# Patient Record
Sex: FEMALE | Race: BLACK OR AFRICAN AMERICAN | NOT HISPANIC OR LATINO | Employment: OTHER | URBAN - METROPOLITAN AREA
[De-identification: names, ages, dates, MRNs, and addresses within clinical notes are randomized per-mention and may not be internally consistent; named-entity substitution may affect disease eponyms.]

---

## 2017-12-22 ENCOUNTER — HOSPITAL ENCOUNTER (EMERGENCY)
Facility: HOSPITAL | Age: 67
Discharge: HOME | End: 2017-12-22
Payer: MEDICARE

## 2017-12-22 VITALS
SYSTOLIC BLOOD PRESSURE: 137 MMHG | TEMPERATURE: 98 F | OXYGEN SATURATION: 99 % | DIASTOLIC BLOOD PRESSURE: 72 MMHG | BODY MASS INDEX: 25.15 KG/M2 | HEART RATE: 61 BPM | WEIGHT: 142 LBS | RESPIRATION RATE: 18 BRPM

## 2017-12-22 DIAGNOSIS — S46.912A MUSCLE STRAIN OF LEFT UPPER ARM, INITIAL ENCOUNTER: Primary | ICD-10-CM

## 2017-12-22 DIAGNOSIS — R52 PAIN: ICD-10-CM

## 2017-12-22 DIAGNOSIS — M79.622 LEFT UPPER ARM PAIN: ICD-10-CM

## 2017-12-22 PROCEDURE — 99283 EMERGENCY DEPT VISIT LOW MDM: CPT

## 2017-12-22 RX ORDER — TIZANIDINE 4 MG/1
4 TABLET ORAL NIGHTLY PRN
Qty: 10 TABLET | Refills: 0 | Status: SHIPPED | OUTPATIENT
Start: 2017-12-22 | End: 2018-01-01

## 2017-12-22 NOTE — ED PROVIDER NOTES
"  History      Chief Complaint   Patient presents with    Arm Pain     Pt states, "I was picking up a chair Wednesday, and I felt a pinch in my arm" L upper arm pain. FROM noted.        Review of patient's allergies indicates:   Allergen Reactions    Ace inhibitors Swelling        HPI   HPI    2017, 1:44 PM   History obtained from the patient      History of Present Illness: Kevin Knight is a 67 y.o. female patient who presents to the Emergency Department for left upper arm pain x 2 days.  Patient states that pain started after she lifted a chair.  No treatments tried.  Movement increases pain.  Denies fever, congestion, chest pain, shortness of breath, weakness, numbness.        Arrival mode: Personal vehicle     PCP: Rizwan Lerma MD       Past Medical History:  Past Medical History:   Diagnosis Date    GERD (gastroesophageal reflux disease)     Hypertension        Past Surgical History:  Past Surgical History:   Procedure Laterality Date     SECTION           Family History:  History reviewed. No pertinent family history.    Social History:  Social History     Social History Main Topics    Smoking status: Never Smoker    Smokeless tobacco: Never Used    Alcohol use No    Drug use: No    Sexual activity: Not on file       ROS   Review of Systems   Constitutional: Negative for chills and fever.   HENT: Negative for congestion and sore throat.    Respiratory: Negative for cough and wheezing.    Gastrointestinal: Negative for diarrhea and vomiting.   Musculoskeletal: Positive for arthralgias and myalgias.       Physical Exam      Initial Vitals [17 1324]   BP Pulse Resp Temp SpO2   (!) 166/78 78 16 97.6 °F (36.4 °C) 100 %      MAP       107.33          Physical Exam  Nursing Notes and Vital Signs Reviewed.  Constitutional: Patient is in no apparent distress. Awake and alert. Well-developed and well-nourished.  Head: Atraumatic. Normocephalic.  Eyes: PERRL. EOM intact. " Conjunctivae are not pale. No scleral icterus.  ENT: Mucous membranes are moist. Oropharynx is clear and symmetric.    Neck: Supple. Full ROM. No lymphadenopathy.  Cardiovascular: Regular rate. Regular rhythm. No murmurs, rubs, or gallops. Distal pulses are 2+ and symmetric.  Pulmonary/Chest: No respiratory distress. Clear to auscultation bilaterally. No wheezing, rales, or rhonchi.  Abdominal: Soft and non-distended.  There is no tenderness.  No rebound, guarding, or rigidity. Good bowel sounds.  Musculoskeletal: Moves all extremities. No obvious deformities. No edema. No calf tenderness.    LUE:  Pain with flexion and extension of arm.  TTP left upper arm.  No bruising or swelling noted.  Brisk capillary refill; radial pulse 2+.  Skin: Warm and dry.  Neurological:  Alert, awake, and appropriate.  Normal speech.  No acute focal neurological deficits are appreciated.  Psychiatric: Normal affect. Good eye contact. Appropriate in content.    ED Course    Procedures  ED Vital Signs:  Vitals:    12/22/17 1324 12/22/17 1445   BP: (!) 166/78 137/72   Pulse: 78 61   Resp: 16 18   Temp: 97.6 °F (36.4 °C)    TempSrc: Oral    SpO2: 100% 99%   Weight: 64.4 kg (142 lb)        Abnormal Lab Results:  Labs Reviewed - No data to display       Imaging Results:  Imaging Results          X-Ray Humerus 2 View Left (Final result)  Result time 12/22/17 14:18:52    Final result by Saad Duarte MD (12/22/17 14:18:52)                 Impression:      No acute fracture or dislocation.        Electronically signed by: SAAD DUARTE MD  Date:     12/22/17  Time:    14:18              Narrative:    History:  Pain    Comparison:  None    Results: 2 views of the left humerus were obtained.     No evidence of acute fracture or dislocation.  Bony mineralization is normal.  Soft tissues are unremarkable. Visualized lung fields are clear.      Mild a.c. joint degenerative changes.                                      The Emergency Provider  reviewed the vital signs and test results, which are outlined above.    ED Discussion     3:01 PM:  Discussed with pt all pertinent ED information and results. Discussed pt dx and plan of tx. Gave pt all f/u and return to the ED instructions. All questions and concerns were addressed at this time. Pt expresses understanding of information and instructions, and is comfortable with plan to discharge. Pt is stable for discharge.    I discussed with patient and/or family/caretaker that evaluation in the ED does not suggest any emergent or life threatening medical conditions requiring immediate intervention beyond what was provided in the ED, and I believe patient is safe for discharge.  Regardless, an unremarkable evaluation in the ED does not preclude the development or presence of a serious of life threatening condition. As such, patient was instructed to return immediately for any worsening or change in current symptoms.      ED Medication(s):  Medications - No data to display    Discharge Medication List as of 12/22/2017  2:27 PM      START taking these medications    Details   tiZANidine (ZANAFLEX) 4 MG tablet Take 1 tablet (4 mg total) by mouth nightly as needed., Starting Fri 12/22/2017, Until Mon 1/1/2018, Print             Follow-up Information     Rizwan Leram MD In 3 days.    Specialty:  Family Medicine  Contact information:  [address] [facility]  [facility] [id number] [phone]                     Medical Decision Making                  Clinical Impression       ICD-10-CM ICD-9-CM   1. Muscle strain of left upper arm, initial encounter S46.912A 840.9   2. Left upper arm pain M79.622 729.5   3. Pain R52 780.96       Disposition:   Disposition: Discharged  Condition: Stable           Lexi Peterson PA-C  12/22/17 1501

## 2018-03-24 ENCOUNTER — HOSPITAL ENCOUNTER (EMERGENCY)
Facility: HOSPITAL | Age: 68
Discharge: HOME | End: 2018-03-25
Attending: EMERGENCY MEDICINE
Payer: MEDICARE

## 2018-03-24 VITALS
HEART RATE: 78 BPM | OXYGEN SATURATION: 100 % | DIASTOLIC BLOOD PRESSURE: 76 MMHG | HEIGHT: 63 IN | TEMPERATURE: 98 F | SYSTOLIC BLOOD PRESSURE: 165 MMHG | BODY MASS INDEX: 25.34 KG/M2 | RESPIRATION RATE: 20 BRPM | WEIGHT: 143 LBS

## 2018-03-24 DIAGNOSIS — S81.812A LACERATION OF LEFT LOWER EXTREMITY, INITIAL ENCOUNTER: Primary | ICD-10-CM

## 2018-03-24 PROCEDURE — 99283 EMERGENCY DEPT VISIT LOW MDM: CPT | Mod: 25

## 2018-03-24 PROCEDURE — 12034 INTMD RPR S/TR/EXT 7.6-12.5: CPT | Mod: LT

## 2018-03-24 PROCEDURE — 90471 IMMUNIZATION ADMIN: CPT | Performed by: EMERGENCY MEDICINE

## 2018-03-24 PROCEDURE — 63600175 PHARM REV CODE 636 W HCPCS: Performed by: EMERGENCY MEDICINE

## 2018-03-24 PROCEDURE — 90715 TDAP VACCINE 7 YRS/> IM: CPT | Performed by: EMERGENCY MEDICINE

## 2018-03-24 RX ORDER — LIDOCAINE HYDROCHLORIDE 10 MG/ML
1 INJECTION, SOLUTION EPIDURAL; INFILTRATION; INTRACAUDAL; PERINEURAL
Status: COMPLETED | OUTPATIENT
Start: 2018-03-24 | End: 2018-03-25

## 2018-03-24 RX ADMIN — CLOSTRIDIUM TETANI TOXOID ANTIGEN (FORMALDEHYDE INACTIVATED), CORYNEBACTERIUM DIPHTHERIAE TOXOID ANTIGEN (FORMALDEHYDE INACTIVATED), BORDETELLA PERTUSSIS TOXOID ANTIGEN (GLUTARALDEHYDE INACTIVATED), BORDETELLA PERTUSSIS FILAMENTOUS HEMAGGLUTININ ANTIGEN (FORMALDEHYDE INACTIVATED), BORDETELLA PERTUSSIS PERTACTIN ANTIGEN, AND BORDETELLA PERTUSSIS FIMBRIAE 2/3 ANTIGEN 0.5 ML: 5; 2; 2.5; 5; 3; 5 INJECTION, SUSPENSION INTRAMUSCULAR at 11:03

## 2018-03-25 PROCEDURE — 25000003 PHARM REV CODE 250: Performed by: EMERGENCY MEDICINE

## 2018-03-25 PROCEDURE — 12034 INTMD RPR S/TR/EXT 7.6-12.5: CPT | Mod: LT

## 2018-03-25 RX ADMIN — LIDOCAINE HYDROCHLORIDE 10 MG: 10 INJECTION, SOLUTION EPIDURAL; INFILTRATION; INTRACAUDAL; PERINEURAL at 12:03

## 2018-03-25 RX ADMIN — BACITRACIN, NEOMYCIN, POLYMYXIN B 1 EACH: 400; 3.5; 5 OINTMENT TOPICAL at 12:03
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
## 2018-04-02 ENCOUNTER — HOSPITAL ENCOUNTER (EMERGENCY)
Facility: HOSPITAL | Age: 68
Discharge: HOME | End: 2018-04-02
Payer: MEDICARE

## 2018-04-02 VITALS
DIASTOLIC BLOOD PRESSURE: 69 MMHG | RESPIRATION RATE: 16 BRPM | HEART RATE: 64 BPM | SYSTOLIC BLOOD PRESSURE: 162 MMHG | OXYGEN SATURATION: 100 % | HEIGHT: 62 IN | BODY MASS INDEX: 24.84 KG/M2 | WEIGHT: 135 LBS | TEMPERATURE: 98 F

## 2018-04-02 DIAGNOSIS — I10 ELEVATED BLOOD PRESSURE READING WITH DIAGNOSIS OF HYPERTENSION: ICD-10-CM

## 2018-04-02 DIAGNOSIS — Z48.02 VISIT FOR SUTURE REMOVAL: Primary | ICD-10-CM

## 2018-04-02 PROCEDURE — 99281 EMR DPT VST MAYX REQ PHY/QHP: CPT

## 2018-04-02 RX ORDER — ESOMEPRAZOLE MAGNESIUM 40 MG/1
40 CAPSULE, DELAYED RELEASE ORAL DAILY
COMMUNITY
Start: 2015-03-23 | End: 2019-12-16 | Stop reason: SDUPTHER
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
## 2018-04-11 ENCOUNTER — OFFICE VISIT (OUTPATIENT)
Dept: PODIATRY | Facility: CLINIC | Age: 68
End: 2018-04-11
Payer: MEDICARE

## 2018-04-11 VITALS
WEIGHT: 134.94 LBS | HEART RATE: 80 BPM | DIASTOLIC BLOOD PRESSURE: 79 MMHG | SYSTOLIC BLOOD PRESSURE: 139 MMHG | BODY MASS INDEX: 24.83 KG/M2 | HEIGHT: 62 IN

## 2018-04-11 DIAGNOSIS — M79.672 LEFT FOOT PAIN: ICD-10-CM

## 2018-04-11 DIAGNOSIS — Q82.8 POROKERATOSIS: Primary | ICD-10-CM

## 2018-04-11 PROCEDURE — 99203 OFFICE O/P NEW LOW 30 MIN: CPT | Mod: S$GLB,,, | Performed by: PODIATRIST

## 2018-04-11 PROCEDURE — 3078F DIAST BP <80 MM HG: CPT | Mod: CPTII,S$GLB,, | Performed by: PODIATRIST

## 2018-04-11 PROCEDURE — 3075F SYST BP GE 130 - 139MM HG: CPT | Mod: CPTII,S$GLB,, | Performed by: PODIATRIST

## 2018-04-11 PROCEDURE — 99999 PR PBB SHADOW E&M-EST. PATIENT-LVL III: CPT | Mod: PBBFAC,,, | Performed by: PODIATRIST

## 2018-04-11 NOTE — PROGRESS NOTES
[facility] -   PODIATRIC MEDICINE AND SURGERY  PROGRESS NOTE  4/17/2018    PODIATRY NOTE  PCP: Dr. Rizwan Lerma MD    CHIEF COMPLAINT   Chief Complaint   Patient presents with    Callouses     left plantar callous in arch pain when walking       HPI  Kevin Knight is a 67 y.o. female who has a past medical history of GERD (gastroesophageal reflux disease) and Hypertension.   Kevin presents to clinic today complaining of painful lesion on bottom of left foot.    Patient describes pain as:   Location:left plantar medial arch   Quality: throbbing  Severity:8/10  Duration:several months  Modifying Factors (Aggravating): weight bearing   Modifying Factors (Alleviating): offloading     Patient denies other pedal complaints at this time.     PMH  Past Medical History:   Diagnosis Date    GERD (gastroesophageal reflux disease)     Hypertension        PROBLEM LIST  There are no active problems to display for this patient.      MEDS  Current Outpatient Prescriptions on File Prior to Visit   Medication Sig Dispense Refill    amlodipine (NORVASC) 10 MG tablet Take 1 tablet (10 mg total) by mouth once daily. 30 tablet 0    esomeprazole (NEXIUM) 40 MG capsule Take 40 mg by mouth Daily.      hydrochlorothiazide (HYDRODIURIL) 25 MG tablet Take 1 tablet (25 mg total) by mouth once daily. 30 tablet 0    tramadol (ULTRAM) 50 mg tablet Take 50 mg by mouth every 6 (six) hours as needed for Pain.      vitamin D 1000 units Tab Take 185 mg by mouth once daily.       No current facility-administered medications on file prior to visit.        Medication List with Changes/Refills   Current Medications    AMLODIPINE (NORVASC) 10 MG TABLET    Take 1 tablet (10 mg total) by mouth once daily.    ESOMEPRAZOLE (NEXIUM) 40 MG CAPSULE    Take 40 mg by mouth Daily.    HYDROCHLOROTHIAZIDE (HYDRODIURIL) 25 MG TABLET    Take 1 tablet (25 mg total) by mouth once daily.    TRAMADOL (ULTRAM) 50 MG TABLET    Take 50 mg by  "mouth every 6 (six) hours as needed for Pain.    VITAMIN D 1000 UNITS TAB    Take 185 mg by mouth once daily.       PSH     Past Surgical History:   Procedure Laterality Date     SECTION          ALL  Review of patient's allergies indicates:   Allergen Reactions    Ace inhibitors Swelling       SOC     Social History   Substance Use Topics    Smoking status: Never Smoker    Smokeless tobacco: Never Used    Alcohol use No         FAMILY HX  No family history on file.         REVIEW OF SYSTEMS  Constitutional: Negative for chills and fever.   Respiratory: Negative for shortness of breath.    Cardiovascular: Negative for chest pain, palpitations, orthopnea, claudication and leg swelling.   Gastrointestinal: Negative for diarrhea, nausea and vomiting.   Musculoskeletal: Negative for joint pain. Positive for left foot pain   Skin: Negative for rash.   Neurological: Negative for dizziness, tingling, sensory change, focal weakness and weakness.   Psychiatric/Behavioral: Negative.    PHYSICAL EXAM  Vitals:    18 1328   BP: 139/79   Pulse: 80   Weight: 61.2 kg (134 lb 14.7 oz)   Height: 5' 2" (1.575 m)   PainSc:   8   PainLoc: Foot       General: This patient is well-developed, well-nourished and appears stated age, well-oriented to person, place and time, and cooperative and pleasant on today's visit    LOWER EXTREMITY  Vascular exam:   · Dorsalis pedis and posterior tibial pulses palpable 2/4 bilaterally.   · Capillary refill time immediate to the toes.   · Feet are warm to the touch. Skin temperature warm to warm from proximally to distally   · There are varicosities, telangiectasias noted to bilateral foot and ankle regions.   · There are no ecchymoses noted to bilateral foot and ankle regions.   · There is no gross lower extremity edema.    Dermatologic exam:   · Skin moist with healthy texture and turgor.  · There are no open ulcerations, lacerations, or fissures to bilateral foot and ankle regions. " There are no signs of infection as there is no erythema, no proximal-extending lymphangiitis, no fluctuance, or crepitus noted on palpation to bilateral foot and ankle regions.   · There is no interdigital maceration.   · There are hyperkeratotic lesion plantar medial arch LEFT.  Nails are well-trimmed.    Neurologic exam:  · Epicritic sensation is intact as the patient is able to sense light touch to bilateral foot and ankle regions.   · Achilles and patellar deep tendon reflexes intact  · Babinski reflex absent    Musculoskeletal/Orthopedic exam:   · No symptomatic structural abnormalities noted  · Muscle strength AT/EHL/EDL/PT: 5/5; Achilles/Gastroc/Soleus: 5/5; PB/PL: 5/5 Muscle tone is normal.  · Ankle joint ROM  B/L supple DF/PF, non-crepitus  · STJ ROM supple inv/ev, non crepitus   · MTPJ b/l supple DF/PF, non crepitus          ASSESSMENT  Encounter Diagnoses   Name Primary?    Porokeratosis - Left Foot Yes    Left foot pain          PLAN  1. Patient was educated about clinical and imaging findings, and verbalizes understanding of above.     Diagnoses and all orders for this visit:  Porokeratosis - Left Foot    Left foot pain      2. Treatment plan: -With patient's permission via verbal consent, the involved area was cleansed with an alcohol swab. Trimming of hyperkeratotic lesions deep to epidermal layer x 1 left foot was performed with a #15 blade without incident. Patient relates relief following the procedure. Patient will continue to monitor the areas daily, inspect feet, wear protective shoe gear when ambulatory, moisturizer to maintain skin integrity.    -Rx for Urea 40 OR Amlactin for calluses, metatarsal pad dispensed and applied for offloading callus. Shoe recommendations provided for accomodative foot wear.    3. RTC  for follow up/evaluation as scheduled       No future appointments.    Report Electronically Signed By:  Gisselle Mayer DPM   Podiatric Medicine & Surgery  [facility]  [place]  4/17/2018

## 2018-04-11 NOTE — PATIENT INSTRUCTIONS
"Porokeratosis    Many people suffer from painful growths on the bottom of their feet. The origins of these growths are varied. The most common growth on the bottom of the foot that may be painful is known as a callus, which is a broad layer of hard skin similar in appearance to the calluses one develops on their hands after doing labor like raking leaves.   However, some people develop small hard growths usually more than one on the bottom of their feet, many will complain they feel like they have a stone in their shoe. Although there are potentially many origins of this type of lesion including warts and intractable plantar keratoma, a very common cause of this type of growth is known as a porokeratosis.  Porokeratosis is a general term to a group of dermatological problems that occur in various parts of the body. This discussion is limited to punctate porokeratosis. These lesions are described as dozens of discrete or grouped seedlike hyperkeratotic lesions with characteristic thin, raised ridgelike margins that develop on the palms and the soles. Patients usually have other forms of porokeratosis as well, most commonly the linear or Mibelli types. Because of their description many patients will refer to them as seed corns.  These lesions generally occur on the weight bearing part of the foot which is the ball of the foot and the heel but may also occur in the mid arch. These lesions are generally not associated with any bony prominence as is the case with a callus or an intractable plantar keratoma.   These lesions are thought to be nothing more than plugged sweat glands however there is some debate as to whether this is true or not. In any event if you consider that the feet can have as many as 250,000 sweat glands it is easy to see how some of them could get "clogged" from contstant friction and pressure and cause this condition to occur.  The degree of discomfort a patient will experience will vary from person " to person. In many people they are a non-issue but in others they can be very painful. Factors that may exacerbate the pain include the number of lesions, their location, the type of shoe a patient wears, the amount of walking a person does during the course of a day and even possibly the amount or lack of fat on the bottom of the foot.    TREATMENT    For this reason there are various treatments ranging from absolutely nothing to surgical excision. In cases where there is pain during ambulation most podiatrists will attempt to curette (carve out) the lesion(s) from the surrounding skin.   In most cases this can be done without anesthesia and is not a particularly painful procedure. This is usually what I do the first time a patient presents with this type of problem. Based on recurrence, if any, will determine further treatment. Many times curetting the lesion(s) will be the first and last treatment for these lesions. In other cases after a period of relief ranging from a couple of days to a few years the patient may return complaining of pain in the same area. If it has been a fairly long time since last treatment I may just go ahead and curette the lesion again.   Wearing a cushioned innersole, although it will not rid you of the porokeratosis may reduce the pressure on these lesions and make it more comfortable to walk.    I recommend using Flexitol Heel Balm cream at night every night before bed.  This can be found at [facility], [facility], or [facility].

## 2018-07-24 DIAGNOSIS — M25.569 KNEE PAIN, UNSPECIFIED CHRONICITY, UNSPECIFIED LATERALITY: Primary | ICD-10-CM

## 2018-09-05 DIAGNOSIS — M25.562 PAIN IN BOTH KNEES, UNSPECIFIED CHRONICITY: Primary | ICD-10-CM

## 2018-09-05 DIAGNOSIS — M25.561 PAIN IN BOTH KNEES, UNSPECIFIED CHRONICITY: Primary | ICD-10-CM

## 2019-11-05 ENCOUNTER — HOSPITAL ENCOUNTER (EMERGENCY)
Facility: HOSPITAL | Age: 69
Discharge: HOME | End: 2019-11-05
Attending: EMERGENCY MEDICINE
Payer: MEDICARE

## 2019-11-05 VITALS
HEIGHT: 57 IN | RESPIRATION RATE: 20 BRPM | BODY MASS INDEX: 33.29 KG/M2 | OXYGEN SATURATION: 99 % | WEIGHT: 154.31 LBS | SYSTOLIC BLOOD PRESSURE: 140 MMHG | TEMPERATURE: 98 F | HEART RATE: 66 BPM | DIASTOLIC BLOOD PRESSURE: 67 MMHG

## 2019-11-05 DIAGNOSIS — M25.562 PATELLOFEMORAL ARTHRALGIA OF LEFT KNEE: Primary | ICD-10-CM

## 2019-11-05 DIAGNOSIS — M25.562 LEFT KNEE PAIN: ICD-10-CM

## 2019-11-05 DIAGNOSIS — M79.662 PAIN OF LEFT LOWER LEG: ICD-10-CM

## 2019-11-05 LAB — D DIMER PPP IA.FEU-MCNC: 0.39 MG/L FEU

## 2019-11-05 PROCEDURE — 85379 FIBRIN DEGRADATION QUANT: CPT | Mod: ER

## 2019-11-05 PROCEDURE — 99283 EMERGENCY DEPT VISIT LOW MDM: CPT | Mod: 25,ER

## 2019-11-05 RX ORDER — HYDROCHLOROTHIAZIDE 25 MG/1
25 TABLET ORAL DAILY
Refills: 0 | COMMUNITY
Start: 2019-09-12 | End: 2019-12-16 | Stop reason: SDUPTHER

## 2019-11-05 RX ORDER — NABUMETONE 500 MG/1
500 TABLET, FILM COATED ORAL DAILY
Qty: 30 TABLET | Refills: 0 | Status: SHIPPED | OUTPATIENT
Start: 2019-11-05 | End: 2021-04-17

## 2019-11-10 NOTE — ED PROVIDER NOTES
"Encounter Date: 2019       History     Chief Complaint   Patient presents with    Extremity Weakness     Pt c/o pain and weakness to left knee and leg. Pt has Hx of leg injury. Pt states left knee popping out of place.     The history is provided by the patient.   Leg Pain    The incident occurred at home. The injury mechanism was a fall. The incident occurred yesterday. The pain is present in the left knee and left leg (posterior aspect of the left lower leg). The quality of the pain is described as aching. The pain is at a severity of 7/10. The pain has been fluctuating since onset. Pertinent negatives include no numbness, no inability to bear weight, no loss of motion, no muscle weakness, no loss of sensation and no tingling. The symptoms are aggravated by activity, bearing weight and palpation. She has tried NSAIDs (Aleve) for the symptoms. The treatment provided no relief.   Patient presents with complaints of pain to her left knee and calf that began yesterday following a fall. She states that it "felt like her knee was popping out of place and just gave out on her".  She denies any further complaints or concerns.         PCP:     Rizwan Leram MD        Review of patient's allergies indicates:   Allergen Reactions    Ace inhibitors Swelling     Past Medical History:   Diagnosis Date    GERD (gastroesophageal reflux disease)     Hypertension      Past Surgical History:   Procedure Laterality Date     SECTION       History reviewed. No pertinent family history.  Social History     Tobacco Use    Smoking status: Never Smoker    Smokeless tobacco: Never Used   Substance Use Topics    Alcohol use: No    Drug use: No     Review of Systems   Constitutional: Negative for chills and fever.   HENT: Negative for congestion and sore throat.    Respiratory: Negative for cough, chest tightness, shortness of breath and wheezing.    Cardiovascular: Negative for chest pain and palpitations. "   Gastrointestinal: Negative for abdominal pain, diarrhea, nausea and vomiting.   Genitourinary: Negative for dysuria.   Musculoskeletal: Negative for back pain.        Positive for pain of the left knee and posterior aspect (calf) of left lower leg.    Skin: Negative for rash.   Neurological: Negative for dizziness, tingling, weakness, numbness and headaches.   Hematological: Does not bruise/bleed easily.       Physical Exam     Initial Vitals [11/05/19 1402]   BP Pulse Resp Temp SpO2   (!) 140/67 66 20 97.8 °F (36.6 °C) 99 %      MAP       --         Physical Exam    Nursing note and vitals reviewed.  Constitutional: She appears well-developed and well-nourished. She is cooperative. She does not appear ill. No distress.   HENT:   Head: Normocephalic and atraumatic.   Nose: Nose normal.   Mouth/Throat: Uvula is midline, oropharynx is clear and moist and mucous membranes are normal.   Eyes: Conjunctivae, EOM and lids are normal. Pupils are equal, round, and reactive to light.   Neck: Trachea normal and normal range of motion. Neck supple.   Cardiovascular: Normal rate, regular rhythm, intact distal pulses and normal pulses.   Pulmonary/Chest: Effort normal and breath sounds normal. No respiratory distress. She has no wheezes. She has no rhonchi. She has no rales.   Musculoskeletal: Normal range of motion. She exhibits no edema.        Left knee: She exhibits swelling (mild swelling noted to anterior aspect of the left knee - prepatellar region). She exhibits normal range of motion, no effusion and no ecchymosis. Tenderness (reproducible tenderness noted to left anterior knee - no crepitus or obvious deformity noted) found. No MCL and no LCL tenderness noted.        Left lower leg: She exhibits tenderness (reproducible tenderness to posterior aspect of the left lower leg upon palpation of the gastrocnemius - negative Cornell's - neurovascular intact distally ). She exhibits no bony tenderness, no swelling, no edema and  "no deformity.        Legs:  Neurological: She is alert and oriented to person, place, and time. She has normal strength. No sensory deficit. Gait normal. GCS eye subscore is 4. GCS verbal subscore is 5. GCS motor subscore is 6.   Neurovascular intact to all extremities.    Skin: Skin is warm, dry and intact. Capillary refill takes less than 2 seconds. No abrasion, no bruising, no ecchymosis and no rash noted.   Normal color and turgor.    Psychiatric: She has a normal mood and affect. Her speech is normal and behavior is normal. Cognition and memory are normal.         ED Course   Procedures    ED Lab Results:   Results for orders placed or performed during the hospital encounter of 11/05/19   D dimer, quantitative   Result Value Ref Range    D-Dimer 0.39 <0.50 mg/L FEU          ED Imaging Results:   Imaging Results          X-Ray Knee Complete 4 or More Views Left (Final result)  Result time 11/05/19 15:08:41    Final result by Barrera Avendaño MD (11/05/19 15:08:41)                 Impression:      Degenerative changes.      Electronically signed by: Barrera Avendaño MD  Date:    11/05/2019  Time:    15:08             Narrative:    EXAMINATION:  XR KNEE COMP 4 OR MORE VIEWS LEFT    CLINICAL HISTORY:  Pain in left knee.  .    COMPARISON:  None    FINDINGS:  Moderate medial and mild patellofemoral compartment DJD.  Medial compartment subchondral sclerosis and marginal osteophyte formation.  No acute fracture.  Alignment is satisfactory.                                ED Course Vitals  Vitals:    11/05/19 1402   BP: (!) 140/67   BP Location: Left arm   Patient Position: Sitting   Pulse: 66   Resp: 20   Temp: 97.8 °F (36.6 °C)   SpO2: 99%   Weight: 70 kg (154 lb 5.2 oz)   Height: 4' 9" (1.448 m)         1525 HOURS RE-EVALUATION & DISPOSITION:   Reassessment at the time of disposition demonstrates that the patient is resting comfortably in no acute distress.  She has remained hemodynamically stable throughout the entire ED " visit and is without objective evidence for acute process requiring urgent intervention or hospitalization. I discussed test results and provided counseling to patient with regard to condition, the treatment plan, specific conditions for return, and the importance of follow up.  Answered questions at this time. The patient is stable for discharge.         X-Rays:   Independently Interpreted Readings:   Other Readings:  Radiographs of the left knee reveal DJD without any acute findings.     Medical Decision Making:   History:   Old Records Summarized: records from clinic visits.  Clinical Tests:   Lab Tests: Ordered and Reviewed  Radiological Study: Ordered and Reviewed    Additional MDM:     Well's Criteria Score:  -Clinical symptoms of DVT (leg swelling, pain with palpation) = 3.0  -Other diagnosis less likely than pulmonary embolism =            0.0  -Heart Rate >100 =   0.0  -Immobilization (= or > than 3 days) or surgery in the previous 4 weeks = 0.0  -Previous DVT/PE = 0.0  -Hemoptysis =          0.0  -Malignancy =           0.0  Well's Probability Score =    3                                    Clinical Impression:       ICD-10-CM ICD-9-CM   1. Patellofemoral arthralgia of left knee M25.562 719.46   2. Left knee pain M25.562 719.46   3. Pain of left lower leg M79.662 729.5           Disposition:   Disposition: Discharged  Condition: Stable  I discussed with patient that the evaluation in the emergency department does not suggest any emergent or life threatening medical condition requiring immediate intervention beyond what was provided in the ED, and I believe patient is safe for discharge.  Regardless, an unremarkable evaluation in the ED does not preclude the development or presence of a serious of life threatening condition. As such, patient was instructed to return immediately for any worsening or change in current symptoms. I also discussed the results of my evaluation and diagnosis with patient and she  concurs with the evaluation and management plan.  Detailed written and verbal instructions provided to patient and she expressed a verbal understanding of the discharge instructions and overall management plan. Reiterated the importance of medication administration and safety and advised patient to follow up with primary care provider in 3-5 days or sooner if needed.  Also advised patient to return to the ER for any complications.     Regarding ARTHRALGIA, the patient was advised to participate in low-impact aerobic activity, perform range of motion exercises for improved flexibility, increase muscle tone by strength training, application of heat or ice, get plenty of sleep,  and avoid staying in one position for an extended period. Patient was encouraged to eat healthy diet full of fruits and vegetables, foods rich in omega-3 fatty acids, maintain/obtain healthy weight, and to take medications as prescribed.     Regarding KNEE PAIN: Patient instructed to follow prescribed therapy.  I encouraged patient to get plenty of rest, work to obtain/maintain healthy weight and BMI, exercise to improve muscle strength and motion to joint area, protect joint from further injury, and avoid overexerting extremity - especially when stiffness or pain is present. Advised patient to follow up with primary care provider in one week if symptoms are still present or condition worsens.         Discharge Medication List as of 11/5/2019  3:25 PM      START taking these medications    Details   nabumetone (RELAFEN) 500 MG tablet Take 1 tablet (500 mg total) by mouth once daily. Take after eating., Starting Tue 11/5/2019, Normal               Follow-up Information     Schedule an appointment as soon as possible for a visit  with Rizwan Lerma MD.    Specialty:  Family Medicine  Contact information:  [address] [facility] [place] [address]  [phone]             Call  Orthopedic Specialist.    Why:  If symptoms  worsen or as needed                              Thomas Doe NP  11/09/19 2025

## 2019-12-17 PROBLEM — G89.29 CHRONIC PAIN OF BOTH KNEES: Status: ACTIVE | Noted: 2019-12-17

## 2019-12-17 PROBLEM — J30.9 ALLERGIC RHINITIS: Status: ACTIVE | Noted: 2019-12-17

## 2019-12-17 PROBLEM — M79.10 MYALGIA: Status: ACTIVE | Noted: 2019-12-17

## 2019-12-17 PROBLEM — M25.561 CHRONIC PAIN OF BOTH KNEES: Status: ACTIVE | Noted: 2019-12-17

## 2019-12-17 PROBLEM — E55.9 VITAMIN D DEFICIENCY: Status: ACTIVE | Noted: 2019-12-17

## 2019-12-17 PROBLEM — K21.9 GERD (GASTROESOPHAGEAL REFLUX DISEASE): Status: ACTIVE | Noted: 2019-12-17

## 2019-12-17 PROBLEM — K59.00 CONSTIPATION: Status: ACTIVE | Noted: 2019-12-17

## 2019-12-17 PROBLEM — I10 HYPERTENSION: Status: ACTIVE | Noted: 2019-12-17

## 2019-12-17 PROBLEM — M25.562 CHRONIC PAIN OF BOTH KNEES: Status: ACTIVE | Noted: 2019-12-17

## 2020-01-29 RX ORDER — NABUMETONE 500 MG/1
500 TABLET, FILM COATED ORAL DAILY
Qty: 30 TABLET | Refills: 0 | OUTPATIENT
Start: 2020-01-29

## 2023-02-24 ENCOUNTER — TELEPHONE (OUTPATIENT)
Dept: INTERNAL MEDICINE | Facility: CLINIC | Age: 73
End: 2023-02-24
Payer: MEDICARE

## 2024-04-24 ENCOUNTER — OFFICE VISIT (OUTPATIENT)
Dept: INTERNAL MEDICINE | Facility: CLINIC | Age: 74
End: 2024-04-24
Payer: MEDICARE

## 2024-04-24 ENCOUNTER — TELEPHONE (OUTPATIENT)
Dept: INTERNAL MEDICINE | Facility: CLINIC | Age: 74
End: 2024-04-24
Payer: MEDICARE

## 2024-04-24 ENCOUNTER — LAB VISIT (OUTPATIENT)
Dept: LAB | Facility: HOSPITAL | Age: 74
End: 2024-04-24
Attending: NURSE PRACTITIONER
Payer: MEDICARE

## 2024-04-24 VITALS
SYSTOLIC BLOOD PRESSURE: 150 MMHG | OXYGEN SATURATION: 99 % | TEMPERATURE: 97 F | WEIGHT: 141.13 LBS | HEART RATE: 59 BPM | BODY MASS INDEX: 31.75 KG/M2 | RESPIRATION RATE: 18 BRPM | HEIGHT: 56 IN | DIASTOLIC BLOOD PRESSURE: 60 MMHG

## 2024-04-24 DIAGNOSIS — G31.83 MILD LEWY BODY DEMENTIA WITH PSYCHOTIC DISTURBANCE: ICD-10-CM

## 2024-04-24 DIAGNOSIS — F02.A2 MILD LEWY BODY DEMENTIA WITH PSYCHOTIC DISTURBANCE: ICD-10-CM

## 2024-04-24 DIAGNOSIS — R73.09 ELEVATED GLUCOSE: ICD-10-CM

## 2024-04-24 DIAGNOSIS — I10 ESSENTIAL HYPERTENSION: ICD-10-CM

## 2024-04-24 DIAGNOSIS — F32.1 MAJOR DEPRESSIVE DISORDER, SINGLE EPISODE, MODERATE: ICD-10-CM

## 2024-04-24 DIAGNOSIS — E78.5 HYPERLIPIDEMIA, UNSPECIFIED HYPERLIPIDEMIA TYPE: ICD-10-CM

## 2024-04-24 DIAGNOSIS — N18.31 CHRONIC KIDNEY DISEASE, STAGE 3A: ICD-10-CM

## 2024-04-24 DIAGNOSIS — I25.119 ATHEROSCLEROSIS OF NATIVE CORONARY ARTERY OF NATIVE HEART WITH ANGINA PECTORIS: ICD-10-CM

## 2024-04-24 DIAGNOSIS — R10.84 GENERALIZED ABDOMINAL PAIN: ICD-10-CM

## 2024-04-24 DIAGNOSIS — Z76.89 ENCOUNTER TO ESTABLISH CARE: ICD-10-CM

## 2024-04-24 DIAGNOSIS — I10 ESSENTIAL (PRIMARY) HYPERTENSION: ICD-10-CM

## 2024-04-24 DIAGNOSIS — Z13.31 POSITIVE DEPRESSION SCREENING: ICD-10-CM

## 2024-04-24 DIAGNOSIS — Z76.89 ENCOUNTER TO ESTABLISH CARE: Primary | ICD-10-CM

## 2024-04-24 PROBLEM — M79.10 MYALGIA: Status: RESOLVED | Noted: 2019-12-17 | Resolved: 2024-04-24

## 2024-04-24 PROBLEM — F02.82: Status: ACTIVE | Noted: 2022-12-20

## 2024-04-24 PROBLEM — I49.5 SICK SINUS SYNDROME: Status: ACTIVE | Noted: 2024-04-24

## 2024-04-24 PROBLEM — Z96.651 STATUS POST TOTAL RIGHT KNEE REPLACEMENT: Status: ACTIVE | Noted: 2021-04-01

## 2024-04-24 PROBLEM — F02.C2: Status: ACTIVE | Noted: 2024-04-24

## 2024-04-24 PROBLEM — L28.0 LICHEN SIMPLEX CHRONICUS: Chronic | Status: ACTIVE | Noted: 2021-03-25

## 2024-04-24 PROBLEM — G24.01 TARDIVE DYSKINESIA: Status: ACTIVE | Noted: 2022-11-27

## 2024-04-24 LAB
ALBUMIN SERPL BCP-MCNC: 3.6 G/DL (ref 3.5–5.2)
ALP SERPL-CCNC: 102 U/L (ref 55–135)
ALT SERPL W/O P-5'-P-CCNC: 17 U/L (ref 10–44)
ANION GAP SERPL CALC-SCNC: 10 MMOL/L (ref 8–16)
AST SERPL-CCNC: 24 U/L (ref 10–40)
BASOPHILS # BLD AUTO: 0.02 K/UL (ref 0–0.2)
BASOPHILS NFR BLD: 0.4 % (ref 0–1.9)
BILIRUB SERPL-MCNC: 0.2 MG/DL (ref 0.1–1)
BILIRUB UR QL STRIP: NEGATIVE
BUN SERPL-MCNC: 26 MG/DL (ref 8–23)
CALCIUM SERPL-MCNC: 9.5 MG/DL (ref 8.7–10.5)
CHLORIDE SERPL-SCNC: 108 MMOL/L (ref 95–110)
CLARITY UR REFRACT.AUTO: CLEAR
CO2 SERPL-SCNC: 23 MMOL/L (ref 23–29)
COLOR UR AUTO: YELLOW
CREAT SERPL-MCNC: 1.3 MG/DL (ref 0.5–1.4)
DIFFERENTIAL METHOD BLD: ABNORMAL
EOSINOPHIL # BLD AUTO: 0.1 K/UL (ref 0–0.5)
EOSINOPHIL NFR BLD: 1.2 % (ref 0–8)
ERYTHROCYTE [DISTWIDTH] IN BLOOD BY AUTOMATED COUNT: 16.2 % (ref 11.5–14.5)
EST. GFR  (NO RACE VARIABLE): 43.1 ML/MIN/1.73 M^2
GLUCOSE SERPL-MCNC: 83 MG/DL (ref 70–110)
GLUCOSE UR QL STRIP: NEGATIVE
HCT VFR BLD AUTO: 34.9 % (ref 37–48.5)
HGB BLD-MCNC: 11.6 G/DL (ref 12–16)
HGB UR QL STRIP: NEGATIVE
IMM GRANULOCYTES # BLD AUTO: 0.01 K/UL (ref 0–0.04)
IMM GRANULOCYTES NFR BLD AUTO: 0.2 % (ref 0–0.5)
KETONES UR QL STRIP: NEGATIVE
LEUKOCYTE ESTERASE UR QL STRIP: NEGATIVE
LYMPHOCYTES # BLD AUTO: 2.1 K/UL (ref 1–4.8)
LYMPHOCYTES NFR BLD: 42.5 % (ref 18–48)
MCH RBC QN AUTO: 31.9 PG (ref 27–31)
MCHC RBC AUTO-ENTMCNC: 33.2 G/DL (ref 32–36)
MCV RBC AUTO: 96 FL (ref 82–98)
MONOCYTES # BLD AUTO: 0.5 K/UL (ref 0.3–1)
MONOCYTES NFR BLD: 9.6 % (ref 4–15)
NEUTROPHILS # BLD AUTO: 2.3 K/UL (ref 1.8–7.7)
NEUTROPHILS NFR BLD: 46.1 % (ref 38–73)
NITRITE UR QL STRIP: NEGATIVE
NRBC BLD-RTO: 0 /100 WBC
PH UR STRIP: 6 [PH] (ref 5–8)
PLATELET # BLD AUTO: 203 K/UL (ref 150–450)
PMV BLD AUTO: 11 FL (ref 9.2–12.9)
POTASSIUM SERPL-SCNC: 4.3 MMOL/L (ref 3.5–5.1)
PROT SERPL-MCNC: 8.2 G/DL (ref 6–8.4)
PROT UR QL STRIP: NEGATIVE
RBC # BLD AUTO: 3.64 M/UL (ref 4–5.4)
SODIUM SERPL-SCNC: 141 MMOL/L (ref 136–145)
SP GR UR STRIP: 1.01 (ref 1–1.03)
TSH SERPL DL<=0.005 MIU/L-ACNC: 1.21 UIU/ML (ref 0.4–4)
URN SPEC COLLECT METH UR: NORMAL
UROBILINOGEN UR STRIP-ACNC: NEGATIVE EU/DL
WBC # BLD AUTO: 4.89 K/UL (ref 3.9–12.7)

## 2024-04-24 PROCEDURE — 1101F PT FALLS ASSESS-DOCD LE1/YR: CPT | Mod: CPTII,S$GLB,, | Performed by: NURSE PRACTITIONER

## 2024-04-24 PROCEDURE — 80061 LIPID PANEL: CPT | Performed by: NURSE PRACTITIONER

## 2024-04-24 PROCEDURE — 99204 OFFICE O/P NEW MOD 45 MIN: CPT | Mod: S$GLB,,, | Performed by: NURSE PRACTITIONER

## 2024-04-24 PROCEDURE — 85025 COMPLETE CBC W/AUTO DIFF WBC: CPT | Mod: PO | Performed by: NURSE PRACTITIONER

## 2024-04-24 PROCEDURE — 36415 COLL VENOUS BLD VENIPUNCTURE: CPT | Mod: PO | Performed by: NURSE PRACTITIONER

## 2024-04-24 PROCEDURE — 81003 URINALYSIS AUTO W/O SCOPE: CPT | Mod: PO | Performed by: NURSE PRACTITIONER

## 2024-04-24 PROCEDURE — 3078F DIAST BP <80 MM HG: CPT | Mod: CPTII,S$GLB,, | Performed by: NURSE PRACTITIONER

## 2024-04-24 PROCEDURE — 3288F FALL RISK ASSESSMENT DOCD: CPT | Mod: CPTII,S$GLB,, | Performed by: NURSE PRACTITIONER

## 2024-04-24 PROCEDURE — 3044F HG A1C LEVEL LT 7.0%: CPT | Mod: CPTII,S$GLB,, | Performed by: NURSE PRACTITIONER

## 2024-04-24 PROCEDURE — 83036 HEMOGLOBIN GLYCOSYLATED A1C: CPT | Performed by: NURSE PRACTITIONER

## 2024-04-24 PROCEDURE — 99999 PR PBB SHADOW E&M-EST. PATIENT-LVL IV: CPT | Mod: PBBFAC,,, | Performed by: NURSE PRACTITIONER

## 2024-04-24 PROCEDURE — 80053 COMPREHEN METABOLIC PANEL: CPT | Mod: PO | Performed by: NURSE PRACTITIONER

## 2024-04-24 PROCEDURE — 1126F AMNT PAIN NOTED NONE PRSNT: CPT | Mod: CPTII,S$GLB,, | Performed by: NURSE PRACTITIONER

## 2024-04-24 PROCEDURE — 3077F SYST BP >= 140 MM HG: CPT | Mod: CPTII,S$GLB,, | Performed by: NURSE PRACTITIONER

## 2024-04-24 PROCEDURE — 1160F RVW MEDS BY RX/DR IN RCRD: CPT | Mod: CPTII,S$GLB,, | Performed by: NURSE PRACTITIONER

## 2024-04-24 PROCEDURE — 1159F MED LIST DOCD IN RCRD: CPT | Mod: CPTII,S$GLB,, | Performed by: NURSE PRACTITIONER

## 2024-04-24 PROCEDURE — 84443 ASSAY THYROID STIM HORMONE: CPT | Mod: PO | Performed by: NURSE PRACTITIONER

## 2024-04-24 RX ORDER — RISPERIDONE 0.25 MG/1
TABLET ORAL
COMMUNITY

## 2024-04-24 RX ORDER — HYDROCHLOROTHIAZIDE 25 MG/1
25 TABLET ORAL DAILY
Qty: 30 TABLET | Refills: 2 | Status: SHIPPED | OUTPATIENT
Start: 2024-04-24 | End: 2024-07-23

## 2024-04-24 NOTE — PROGRESS NOTES
Subjective:       Patient ID: Kevin Knight is a 73 y.o. female.    Chief Complaint: Establish Care and Abdominal Pain    Mrs. Knight presents to clinic with her sister to establish care.  Medical history hypertension, CAD, CKD, and Lewy body dementia.  Release of information signed for prior PCP, Cardiology - Dr. Andujar, and Psychiatry, DR. Patel.  She was previously living with her son and daughter-in-law in [place]. Recently moved in with her sister in [place]  Recently hospitalized in inpatient psych due to hallucinations.  She has been having ongoing hallucination visual and auditory described as demons which is being followed by Psychiatry.  Lewy body dementia is being followed by Dr. Muhammad and Neurology.  She was previously on Aricept, which has now been discontinued.  Medications were adjusted during hospitalization.  Today, patient reports she is still having hallucinations, but are more manageable.  Blood pressure is elevated today.  Reports compliance with medication.    She is overall doing well.  Only complaints today include blurred vision and abdominal pain, which has now resolved.  She is due for routine labs, vaccines, mammogram, colonoscopy, and DEXA scan.        Patient Active Problem List   Diagnosis    Essential hypertension    Vitamin D deficiency    Gastroesophageal reflux disease    Allergic rhinitis    Constipation    Chronic pain of both knees    Atherosclerotic heart disease of native coronary artery with unspecified angina pectoris    Chronic kidney disease, stage 3a    Hyperlipidemia    Lewy body dementia with psychotic disturbance    Lichen simplex chronicus    Status post total right knee replacement    Sick sinus syndrome    Tardive dyskinesia    Major depressive disorder, single episode, moderate       Family History   Problem Relation Name Age of Onset    Hypertension Mother       Past Surgical History:  "  Procedure Laterality Date     SECTION           Current Outpatient Medications:     amLODIPine (NORVASC) 10 MG tablet, TAKE 1 TABLET (10 MG TOTAL) BY MOUTH ONCE DAILY., Disp: 90 tablet, Rfl: 0    risperiDONE (RISPERDAL) 0.25 MG Tab, Take by mouth., Disp: , Rfl:     hydroCHLOROthiazide (HYDRODIURIL) 25 MG tablet, Take 1 tablet (25 mg total) by mouth once daily., Disp: 30 tablet, Rfl: 2    simvastatin (ZOCOR) 20 MG tablet, Take 1 tablet (20 mg total) by mouth every evening., Disp: 90 tablet, Rfl: 3    Review of Systems   Constitutional:  Negative for chills and fever.   Eyes:  Positive for visual disturbance (blurry vision).   Respiratory:  Negative for shortness of breath.    Cardiovascular:  Negative for chest pain, palpitations and leg swelling.   Gastrointestinal:  Positive for abdominal pain (resovled) and constipation. Negative for diarrhea, nausea and vomiting.   Endocrine: Positive for polyuria. Negative for polydipsia and polyphagia.   Genitourinary:  Positive for urgency.   Musculoskeletal:  Negative for myalgias.   Neurological:  Negative for dizziness, weakness and light-headedness.   Psychiatric/Behavioral:  Positive for hallucinations (visual and audio) and sleep disturbance (sometimes). Negative for agitation, behavioral problems, confusion, dysphoric mood, self-injury and suicidal ideas.        Objective:   BP (!) 150/60 (BP Location: Left arm, Patient Position: Sitting, BP Method: Medium (Manual))   Pulse (!) 59   Temp 97.1 °F (36.2 °C)   Resp 18   Ht 4' 8" (1.422 m)   Wt 64 kg (141 lb 1.5 oz)   SpO2 99%   BMI 31.63 kg/m²      Physical Exam  Constitutional:       General: She is not in acute distress.     Appearance: Normal appearance. She is not ill-appearing.   HENT:      Head: Normocephalic.   Eyes:      Conjunctiva/sclera: Conjunctivae normal.      Comments: glasses   Cardiovascular:      Rate and Rhythm: Regular rhythm.      Heart sounds: Normal heart sounds. No murmur " heard.  Pulmonary:      Effort: Pulmonary effort is normal. No respiratory distress.      Breath sounds: Normal breath sounds. No wheezing, rhonchi or rales.   Abdominal:      General: Bowel sounds are normal. There is no distension.      Palpations: Abdomen is soft. There is no mass.      Tenderness: There is abdominal tenderness in the suprapubic area. There is no guarding or rebound.   Musculoskeletal:      Right lower le+ Pitting Edema present.      Left lower le+ Pitting Edema present.      Comments: +RW   Skin:     General: Skin is warm and dry.      Coloration: Skin is not pale.      Findings: No erythema or rash.   Neurological:      Mental Status: She is alert. Mental status is at baseline.      Gait: Gait abnormal.      Comments: Oriented to person, place, time, and situation   Psychiatric:         Mood and Affect: Mood normal.         Behavior: Behavior normal. Behavior is cooperative.         Thought Content: Thought content is not paranoid or delusional. Thought content does not include homicidal or suicidal ideation.         Assessment & Plan     1. Encounter to establish care  Comments:  Reviewed records available in EMR. [facility] for prior PCP, cardiology, and psych notes. Routine labs today. RTC in 2 weeks  Orders:  -     CBC Auto Differential; Future; Expected date: 2024  -     Comprehensive Metabolic Panel; Future; Expected date: 2024  -     TSH; Future; Expected date: 2024  -     Hemoglobin A1C; Future; Expected date: 2024  -     Lipid Panel; Future; Expected date: 2024    2. Mild Lewy body dementia with psychotic disturbance  Overview:  Dr. Muhammad and Dr. Palma    Assessment & Plan:  Reviewed last neurology note from Dr. Muhammad from 10/23.  Cognition appears at baseline.  No longer on Aricept.  Hallucinations being managed by Psychiatry.    Orders:  -     CBC Auto Differential; Future; Expected date: 2024  -     TSH; Future; Expected date:  04/24/2024    3. Essential hypertension  Assessment & Plan:  Blood pressure elevated today in clinic.  Continue amlodipine 10 mg daily.  We will add on hydrochlorothiazide 25 mg daily.  Keep blood pressure log x2 and follow up in clinic for further adjustments.    Orders:  -     hydroCHLOROthiazide (HYDRODIURIL) 25 MG tablet; Take 1 tablet (25 mg total) by mouth once daily.  Dispense: 30 tablet; Refill: 2  -     Comprehensive Metabolic Panel; Future; Expected date: 04/24/2024  -     Lipid Panel; Future; Expected date: 04/24/2024    4. Major depressive disorder, single episode, moderate  Overview:  Dr. Palma    Assessment & Plan:  Secondary to Lewy body dementia.  Denies SI/HI or self injury.  Continue Risperdal.  Followed by Psychiatry.      5. Atherosclerosis of native coronary artery of native heart with angina pectoris  Assessment & Plan:  Asymptomatic today.  Received records from cardiologist, Dr. Andujar, last seen 12/29/2023.  Requesting to establish care with Cardiology at [facility].  Reporting atorvastatin was stopped due to side effects. Will try starting simvastatin and monitor.    Orders:  -     Ambulatory referral/consult to Cardiology; Future; Expected date: 05/01/2024  -     Comprehensive Metabolic Panel; Future; Expected date: 04/24/2024  -     Lipid Panel; Future; Expected date: 04/24/2024    6. Chronic kidney disease, stage 3a  Assessment & Plan:  CMP today. Avoid NSAIDs for pain.  Stay well hydrated.      7. Hyperlipidemia, unspecified hyperlipidemia type  Assessment & Plan:  Lipid panel today.  Has not been taking atorvastatin due to potential side effects.  Will try starting simvastatin    Orders:  -     Ambulatory referral/consult to Cardiology; Future; Expected date: 05/01/2024  -     Hemoglobin A1C; Future; Expected date: 04/24/2024  -     Lipid Panel; Future; Expected date: 04/24/2024    8. Generalized abdominal pain  Comments:  Resolved. UA and labs today.  Orders:  -     CBC Auto  "Differential; Future; Expected date: 04/24/2024  -     Urinalysis, Reflex to Urine Culture Urine, Clean Catch    9. Elevated glucose  -     Hemoglobin A1C; Future; Expected date: 04/24/2024    10. Positive depression screening  Comments:  I have reviewed the positive depression score which warrants active treatment with psychotherapy and/or medications.          NEAL Schilling      Portions of this note may have been created with voice recognition software. Occasional "wrong-word" or "sound-a-like" substitutions may have occurred due to the inherent limitations of voice recognition software. Please, read the note carefully and recognize, using context, where substitutions have occurred.       "

## 2024-04-25 DIAGNOSIS — D51.9 ANEMIA DUE TO VITAMIN B12 DEFICIENCY, UNSPECIFIED B12 DEFICIENCY TYPE: ICD-10-CM

## 2024-04-25 DIAGNOSIS — D51.8 OTHER VITAMIN B12 DEFICIENCY ANEMIAS: ICD-10-CM

## 2024-04-25 DIAGNOSIS — E78.5 HYPERLIPIDEMIA, UNSPECIFIED HYPERLIPIDEMIA TYPE: ICD-10-CM

## 2024-04-25 DIAGNOSIS — D64.9 ANEMIA, UNSPECIFIED TYPE: Primary | ICD-10-CM

## 2024-04-25 LAB
CHOLEST SERPL-MCNC: 241 MG/DL (ref 120–199)
CHOLEST/HDLC SERPL: 2.6 {RATIO} (ref 2–5)
ESTIMATED AVG GLUCOSE: 94 MG/DL (ref 68–131)
HBA1C MFR BLD: 4.9 % (ref 4–5.6)
HDLC SERPL-MCNC: 91 MG/DL (ref 40–75)
HDLC SERPL: 37.8 % (ref 20–50)
LDLC SERPL CALC-MCNC: 126.8 MG/DL (ref 63–159)
NONHDLC SERPL-MCNC: 150 MG/DL
TRIGL SERPL-MCNC: 116 MG/DL (ref 30–150)

## 2024-04-25 RX ORDER — SIMVASTATIN 20 MG/1
20 TABLET, FILM COATED ORAL NIGHTLY
Qty: 90 TABLET | Refills: 3 | Status: SHIPPED | OUTPATIENT
Start: 2024-04-25 | End: 2025-04-25

## 2024-04-26 ENCOUNTER — TELEPHONE (OUTPATIENT)
Dept: CARDIOLOGY | Facility: CLINIC | Age: 74
End: 2024-04-26
Payer: MEDICARE

## 2024-04-26 PROBLEM — F32.1 MAJOR DEPRESSIVE DISORDER, SINGLE EPISODE, MODERATE: Status: ACTIVE | Noted: 2024-04-26

## 2024-04-26 NOTE — ASSESSMENT & PLAN NOTE
Secondary to Lewy body dementia.  Denies SI/HI or self injury.  Continue Risperdal.  Followed by Psychiatry.

## 2024-04-26 NOTE — ASSESSMENT & PLAN NOTE
Asymptomatic today.  Received records from cardiologist, Dr. Andujar, last seen 12/29/2023.  Requesting to establish care with Cardiology at [facility].  Reporting atorvastatin was stopped due to side effects. Will try starting simvastatin and monitor.

## 2024-04-26 NOTE — ASSESSMENT & PLAN NOTE
Blood pressure elevated today in clinic.  Continue amlodipine 10 mg daily.  We will add on hydrochlorothiazide 25 mg daily.  Keep blood pressure log x2 and follow up in clinic for further adjustments.

## 2024-04-26 NOTE — ASSESSMENT & PLAN NOTE
Lipid panel today.  Has not been taking atorvastatin due to potential side effects.  Will try starting simvastatin

## 2024-04-26 NOTE — TELEPHONE ENCOUNTER
Attempted to contact patient to schedule a New Patient appointment with one of our cardiologists. Left voice message to call our office back at [phone].

## 2024-04-26 NOTE — ASSESSMENT & PLAN NOTE
Reviewed last neurology note from Dr. Muhammad from 10/23.  Cognition appears at baseline.  No longer on Aricept.  Hallucinations being managed by Psychiatry.

## 2024-04-30 ENCOUNTER — TELEPHONE (OUTPATIENT)
Dept: CARDIOLOGY | Facility: CLINIC | Age: 74
End: 2024-04-30
Payer: MEDICARE

## 2024-04-30 NOTE — TELEPHONE ENCOUNTER
Patient contacted and confirmed the appointment for May. The patient was advised that the provider that goes [facility] location will not be available until August.

## 2024-05-08 ENCOUNTER — HOSPITAL ENCOUNTER (OUTPATIENT)
Dept: CARDIOLOGY | Facility: HOSPITAL | Age: 74
Discharge: HOME | End: 2024-05-08
Attending: NURSE PRACTITIONER
Payer: MEDICARE

## 2024-05-08 ENCOUNTER — OFFICE VISIT (OUTPATIENT)
Dept: INTERNAL MEDICINE | Facility: CLINIC | Age: 74
End: 2024-05-08
Payer: MEDICARE

## 2024-05-08 ENCOUNTER — HOSPITAL ENCOUNTER (OUTPATIENT)
Dept: RADIOLOGY | Facility: HOSPITAL | Age: 74
Discharge: HOME | End: 2024-05-08
Attending: NURSE PRACTITIONER
Payer: MEDICARE

## 2024-05-08 VITALS
WEIGHT: 136.88 LBS | OXYGEN SATURATION: 99 % | RESPIRATION RATE: 18 BRPM | TEMPERATURE: 98 F | HEART RATE: 66 BPM | BODY MASS INDEX: 30.79 KG/M2 | SYSTOLIC BLOOD PRESSURE: 134 MMHG | DIASTOLIC BLOOD PRESSURE: 62 MMHG | HEIGHT: 56 IN

## 2024-05-08 DIAGNOSIS — Z12.31 ENCOUNTER FOR SCREENING MAMMOGRAM FOR MALIGNANT NEOPLASM OF BREAST: ICD-10-CM

## 2024-05-08 DIAGNOSIS — I25.119 ATHEROSCLEROSIS OF NATIVE CORONARY ARTERY OF NATIVE HEART WITH ANGINA PECTORIS: ICD-10-CM

## 2024-05-08 DIAGNOSIS — E78.5 HYPERLIPIDEMIA, UNSPECIFIED HYPERLIPIDEMIA TYPE: ICD-10-CM

## 2024-05-08 DIAGNOSIS — F32.1 MAJOR DEPRESSIVE DISORDER, SINGLE EPISODE, MODERATE: ICD-10-CM

## 2024-05-08 DIAGNOSIS — Z78.0 POSTMENOPAUSAL: ICD-10-CM

## 2024-05-08 DIAGNOSIS — D64.9 ANEMIA, UNSPECIFIED TYPE: ICD-10-CM

## 2024-05-08 DIAGNOSIS — N18.31 CHRONIC KIDNEY DISEASE, STAGE 3A: ICD-10-CM

## 2024-05-08 DIAGNOSIS — K59.00 CONSTIPATION, UNSPECIFIED CONSTIPATION TYPE: ICD-10-CM

## 2024-05-08 DIAGNOSIS — F02.B2 MODERATE LEWY BODY DEMENTIA WITH PSYCHOTIC DISTURBANCE: ICD-10-CM

## 2024-05-08 DIAGNOSIS — I10 ESSENTIAL HYPERTENSION: Primary | ICD-10-CM

## 2024-05-08 DIAGNOSIS — R07.9 CHEST PAIN, UNSPECIFIED TYPE: ICD-10-CM

## 2024-05-08 DIAGNOSIS — Z12.11 COLON CANCER SCREENING: ICD-10-CM

## 2024-05-08 DIAGNOSIS — Z11.59 ENCOUNTER FOR HEPATITIS C SCREENING TEST FOR LOW RISK PATIENT: ICD-10-CM

## 2024-05-08 DIAGNOSIS — G31.83 MODERATE LEWY BODY DEMENTIA WITH PSYCHOTIC DISTURBANCE: ICD-10-CM

## 2024-05-08 PROBLEM — L28.0 LICHEN SIMPLEX CHRONICUS: Chronic | Status: RESOLVED | Noted: 2021-03-25 | Resolved: 2024-05-08

## 2024-05-08 PROBLEM — G24.01 TARDIVE DYSKINESIA: Status: RESOLVED | Noted: 2022-11-27 | Resolved: 2024-05-08

## 2024-05-08 LAB
OHS QRS DURATION: 170 MS
OHS QTC CALCULATION: 491 MS

## 2024-05-08 PROCEDURE — 99214 OFFICE O/P EST MOD 30 MIN: CPT | Mod: S$GLB,,, | Performed by: NURSE PRACTITIONER

## 2024-05-08 PROCEDURE — 77092 TBS I&R FX RSK QHP: CPT | Mod: ,,, | Performed by: RADIOLOGY

## 2024-05-08 PROCEDURE — 3075F SYST BP GE 130 - 139MM HG: CPT | Mod: CPTII,S$GLB,, | Performed by: NURSE PRACTITIONER

## 2024-05-08 PROCEDURE — 77091 TBS TECHL CALCULATION ONLY: CPT | Mod: PO

## 2024-05-08 PROCEDURE — 3078F DIAST BP <80 MM HG: CPT | Mod: CPTII,S$GLB,, | Performed by: NURSE PRACTITIONER

## 2024-05-08 PROCEDURE — G2211 COMPLEX E/M VISIT ADD ON: HCPCS | Mod: S$GLB,,, | Performed by: NURSE PRACTITIONER

## 2024-05-08 PROCEDURE — 1101F PT FALLS ASSESS-DOCD LE1/YR: CPT | Mod: CPTII,S$GLB,, | Performed by: NURSE PRACTITIONER

## 2024-05-08 PROCEDURE — 1126F AMNT PAIN NOTED NONE PRSNT: CPT | Mod: CPTII,S$GLB,, | Performed by: NURSE PRACTITIONER

## 2024-05-08 PROCEDURE — 93010 ELECTROCARDIOGRAM REPORT: CPT | Mod: S$GLB,,, | Performed by: INTERNAL MEDICINE

## 2024-05-08 PROCEDURE — 99999 PR PBB SHADOW E&M-EST. PATIENT-LVL IV: CPT | Mod: PBBFAC,,, | Performed by: NURSE PRACTITIONER

## 2024-05-08 PROCEDURE — 93005 ELECTROCARDIOGRAM TRACING: CPT | Mod: PO

## 2024-05-08 PROCEDURE — 3044F HG A1C LEVEL LT 7.0%: CPT | Mod: CPTII,S$GLB,, | Performed by: NURSE PRACTITIONER

## 2024-05-08 PROCEDURE — 3288F FALL RISK ASSESSMENT DOCD: CPT | Mod: CPTII,S$GLB,, | Performed by: NURSE PRACTITIONER

## 2024-05-08 PROCEDURE — 1159F MED LIST DOCD IN RCRD: CPT | Mod: CPTII,S$GLB,, | Performed by: NURSE PRACTITIONER

## 2024-05-08 PROCEDURE — 77080 DXA BONE DENSITY AXIAL: CPT | Mod: 26,,, | Performed by: RADIOLOGY

## 2024-05-08 PROCEDURE — 1160F RVW MEDS BY RX/DR IN RCRD: CPT | Mod: CPTII,S$GLB,, | Performed by: NURSE PRACTITIONER

## 2024-05-08 NOTE — ASSESSMENT & PLAN NOTE
Lab Results   Component Value Date    WBC 4.89 04/24/2024    HGB 11.6 (L) 04/24/2024    HCT 34.9 (L) 04/24/2024    MCV 96 04/24/2024     04/24/2024     Anemia panel today

## 2024-05-08 NOTE — ASSESSMENT & PLAN NOTE
Reporting chest pain today. EKG and labs today. Continue simvastatin. Keep appt with cardiology on 5/13

## 2024-05-08 NOTE — ASSESSMENT & PLAN NOTE
Start OTC stool softener or  Metamucil   Miralax PRN  Increase hydrate and fiber intake  Notify clinic if no improvement

## 2024-05-08 NOTE — ASSESSMENT & PLAN NOTE
Lab Results   Component Value Date    CHOL 241 (H) 04/24/2024    CHOL 201 (H) 05/01/2020    CHOL 214 (H) 02/24/2020     Lab Results   Component Value Date    HDL 91 (H) 04/24/2024    HDL 87 05/01/2020    HDL 81 02/24/2020     Lab Results   Component Value Date    LDLCALC 126.8 04/24/2024    LDLCALC 96 05/01/2020    LDLCALC 113 (H) 02/24/2020     Lab Results   Component Value Date    TRIG 116 04/24/2024    TRIG 89 05/01/2020    TRIG 102 02/24/2020       Lab Results   Component Value Date    CHOLHDL 37.8 04/24/2024     Uncontrolled. Side effects with atorvastatin. Will try simvastatin.

## 2024-05-08 NOTE — PROGRESS NOTES
Subjective:       Patient ID: Kevin Knight is a 73 y.o. female.    Chief Complaint: Follow-up and Hypertension    Mrs. Knight returns to clinic with her sister for HTN follow up and to review labs. At last visit, she presented to [facility] care.   Medical history hypertension, CAD, CKD, and Lewy body dementia with hallucinations. BP has improved since starting HCTZ with amlodipine. Home readings reportedly 130s. Cholesterol was elevated due to not taking atorvastatin. Reported side effects with atorvastation. She was also noted to be anemic. Not taking any supplements. She c/o left chest pain which  started today and ongoing constipation        Patient Active Problem List   Diagnosis    Essential hypertension    Vitamin D deficiency    Gastroesophageal reflux disease    Allergic rhinitis    Constipation    Chronic pain of both knees    Atherosclerotic heart disease of native coronary artery with unspecified angina pectoris    Chronic kidney disease, stage 3a    Hyperlipidemia    Lewy body dementia with psychotic disturbance    Status post total right knee replacement    Sick sinus syndrome    Major depressive disorder, single episode, moderate    Anemia       Family History   Problem Relation Name Age of Onset    Hypertension Mother       Past Surgical History:   Procedure Laterality Date     SECTION           Current Outpatient Medications:     amLODIPine (NORVASC) 10 MG tablet, TAKE 1 TABLET (10 MG TOTAL) BY MOUTH ONCE DAILY., Disp: 90 tablet, Rfl: 0    hydroCHLOROthiazide (HYDRODIURIL) 25 MG tablet, Take 1 tablet (25 mg total) by mouth once daily., Disp: 30 tablet, Rfl: 2    risperiDONE (RISPERDAL) 0.25 MG Tab, Take by mouth., Disp: , Rfl:     simvastatin (ZOCOR) 20 MG tablet, Take 1 tablet (20 mg total) by mouth every evening., Disp: 90 tablet, Rfl: 3    Review of Systems   Constitutional:  Negative for chills and fever.   Respiratory:  Negative for shortness of breath.    Cardiovascular:  Positive  "for chest pain. Negative for palpitations and leg swelling.   Gastrointestinal:  Positive for abdominal pain and constipation. Negative for diarrhea, nausea and vomiting.   Genitourinary:  Positive for urgency.   Musculoskeletal:  Negative for myalgias.   Neurological:  Negative for dizziness, weakness and light-headedness.   Psychiatric/Behavioral:  Positive for hallucinations (visual and audio) and sleep disturbance (sometimes). Negative for agitation, behavioral problems, confusion, dysphoric mood, self-injury and suicidal ideas.        Objective:   /62 (BP Location: Left arm, Patient Position: Sitting, BP Method: Medium (Manual))   Pulse 66   Temp 98.1 °F (36.7 °C)   Resp 18   Ht 4' 8" (1.422 m)   Wt 62.1 kg (136 lb 14.5 oz)   SpO2 99%   BMI 30.69 kg/m²      Physical Exam  Constitutional:       General: She is not in acute distress.     Appearance: Normal appearance. She is not ill-appearing.   HENT:      Head: Normocephalic.   Eyes:      Conjunctiva/sclera: Conjunctivae normal.      Comments: glasses   Cardiovascular:      Rate and Rhythm: Regular rhythm.      Heart sounds: Normal heart sounds. No murmur heard.  Pulmonary:      Effort: Pulmonary effort is normal. No respiratory distress.      Breath sounds: Normal breath sounds. No wheezing, rhonchi or rales.   Musculoskeletal:      Comments: +RW   Skin:     General: Skin is warm and dry.      Coloration: Skin is not pale.      Findings: No erythema or rash.   Neurological:      Mental Status: She is alert. Mental status is at baseline.      Gait: Gait abnormal.      Comments: Oriented to person, place, time, and situation   Psychiatric:         Mood and Affect: Mood normal.         Behavior: Behavior normal. Behavior is cooperative.         Thought Content: Thought content is not paranoid or delusional. Thought content does not include homicidal or suicidal ideation.         Assessment & Plan     1. Essential hypertension  Assessment & " Plan:  Improved. Continue amlodipine and HCTZ. Continue to monitor BP at home.      2. Major depressive disorder, single episode, moderate  Overview:  Dr. Palma    Assessment & Plan:  Secondary to Lewy body dementia.  Denies SI/HI or self injury.  Continue Risperdal.  Followed by Psychiatry.      3. Chest pain, unspecified type  -     IN OFFICE EKG 12-LEAD (to Muse)  -     TROPONIN I; Future; Expected date: 05/08/2024    4. Atherosclerosis of native coronary artery of native heart with angina pectoris  Assessment & Plan:  Reporting chest pain today. EKG and labs today. Continue simvastatin. Keep appt with cardiology on 5/13      5. Chronic kidney disease, stage 3a  Assessment & Plan:  GFR 43. Avoid NSAIDs. Stay well hydrated. Focus on BP control.       6. Hyperlipidemia, unspecified hyperlipidemia type  Assessment & Plan:  Lab Results   Component Value Date    CHOL 241 (H) 04/24/2024    CHOL 201 (H) 05/01/2020    CHOL 214 (H) 02/24/2020     Lab Results   Component Value Date    HDL 91 (H) 04/24/2024    HDL 87 05/01/2020    HDL 81 02/24/2020     Lab Results   Component Value Date    LDLCALC 126.8 04/24/2024    LDLCALC 96 05/01/2020    LDLCALC 113 (H) 02/24/2020     Lab Results   Component Value Date    TRIG 116 04/24/2024    TRIG 89 05/01/2020    TRIG 102 02/24/2020       Lab Results   Component Value Date    CHOLHDL 37.8 04/24/2024     Uncontrolled. Side effects with atorvastatin. Will try simvastatin.      7. Moderate Lewy body dementia with psychotic disturbance  Overview:  Dr. Muhammad and Dr. Palma    Assessment & Plan:  Stable. Continue Risperdal. Followed by neurology and psychiatry.       8. Constipation, unspecified constipation type  Assessment & Plan:  Start OTC stool softener or  Metamucil   Miralax PRN  Increase hydrate and fiber intake  Notify clinic if no improvement        9. Anemia, unspecified type  Assessment & Plan:  Lab Results   Component Value Date    WBC 4.89 04/24/2024    HGB 11.6 (L)  "04/24/2024    HCT 34.9 (L) 04/24/2024    MCV 96 04/24/2024     04/24/2024     Anemia panel today        10. Encounter for hepatitis C screening test for low risk patient  -     HEPATITIS C ANTIBODY; Future; Expected date: 05/08/2024    11. Postmenopausal  -     Cancel: DXA Bone Density Axial Skeleton 1 or more sites; Future; Expected date: 05/08/2024    12. Colon cancer screening  -     Cologuard Screening (Multitarget Stool DNA); Future; Expected date: 05/08/2024    13. Encounter for screening mammogram for malignant neoplasm of breast  -     Mammo Digital Screening Bilat w/ Herbie; Future; Expected date: 05/08/2024      Visit today included increased complexity associated with the care of the episodic problem  addressed and managing the longitudinal care of the patient due to the serious and/or complex managed problem(s) .        NEAL Schilling      Portions of this note may have been created with voice recognition software. Occasional "wrong-word" or "sound-a-like" substitutions may have occurred due to the inherent limitations of voice recognition software. Please, read the note carefully and recognize, using context, where substitutions have occurred.     "

## 2024-05-09 DIAGNOSIS — Z76.89 ENCOUNTER TO ESTABLISH CARE: Primary | ICD-10-CM

## 2024-05-09 DIAGNOSIS — Z00.00 ROUTINE ADULT HEALTH MAINTENANCE: ICD-10-CM

## 2024-05-13 ENCOUNTER — TELEPHONE (OUTPATIENT)
Dept: CARDIOLOGY | Facility: CLINIC | Age: 74
End: 2024-05-13
Payer: MEDICARE

## 2024-05-13 NOTE — TELEPHONE ENCOUNTER
Called and spoke to pt and her sister regarding rescheduling her appointment that was scheduled for today due to transportation. Pt rescheduled to Wednesday 05/15/24.    ----- Message from Suad Jacobsen sent at 5/13/2024  9:34 AM CDT -----  Contact: Kevin  Type:  Patient Returning Call    Who Called:Kevin  Who Left Message for Patient:Naeem Zelaya MA  Does the patient know what this is regarding?:Appointment  Would the patient rather a call back or a response via [facility]? Callback  Best Call Back Number:[phone]  Additional Information: Missed call

## 2024-05-13 NOTE — TELEPHONE ENCOUNTER
Called and LVM for pt to return call to the office regarding rescheduling her appointment.       ----- Message from Wilson Hamilton sent at 5/13/2024  9:22 AM CDT -----  Contact: [phone]  Patient needs first available appointment due to rescheduling dr appt and ekg. Pt transportation is not available. Please call patient at [phone] . Thanks KB

## 2024-05-15 ENCOUNTER — HOSPITAL ENCOUNTER (OUTPATIENT)
Dept: CARDIOLOGY | Facility: HOSPITAL | Age: 74
Discharge: HOME | End: 2024-05-15
Attending: INTERNAL MEDICINE
Payer: MEDICARE

## 2024-05-15 DIAGNOSIS — Z00.00 ROUTINE ADULT HEALTH MAINTENANCE: ICD-10-CM

## 2024-05-15 DIAGNOSIS — Z76.89 ENCOUNTER TO ESTABLISH CARE: ICD-10-CM

## 2024-05-23 ENCOUNTER — TELEPHONE (OUTPATIENT)
Dept: CARDIOLOGY | Facility: HOSPITAL | Age: 74
End: 2024-05-23
Payer: MEDICARE

## 2024-08-22 ENCOUNTER — HOSPITAL ENCOUNTER (EMERGENCY)
Facility: HOSPITAL | Age: 74
Discharge: HOME | End: 2024-08-22
Attending: EMERGENCY MEDICINE
Payer: MEDICARE

## 2024-08-22 VITALS
SYSTOLIC BLOOD PRESSURE: 111 MMHG | DIASTOLIC BLOOD PRESSURE: 58 MMHG | OXYGEN SATURATION: 98 % | WEIGHT: 130 LBS | BODY MASS INDEX: 29.25 KG/M2 | RESPIRATION RATE: 18 BRPM | HEIGHT: 56 IN | HEART RATE: 61 BPM | TEMPERATURE: 98 F

## 2024-08-22 DIAGNOSIS — M79.606 LEG PAIN: ICD-10-CM

## 2024-08-22 DIAGNOSIS — M79.89 LEG SWELLING: ICD-10-CM

## 2024-08-22 DIAGNOSIS — S80.11XA CONTUSION OF MULTIPLE SITES OF RIGHT LOWER EXTREMITY, INITIAL ENCOUNTER: ICD-10-CM

## 2024-08-22 DIAGNOSIS — S60.521A BLISTER (NONTHERMAL) OF RIGHT HAND, INITIAL ENCOUNTER: Primary | ICD-10-CM

## 2024-08-22 LAB
ALBUMIN SERPL BCP-MCNC: 3.5 G/DL (ref 3.5–5.2)
ALP SERPL-CCNC: 90 U/L (ref 55–135)
ALT SERPL W/O P-5'-P-CCNC: 12 U/L (ref 10–44)
ANION GAP SERPL CALC-SCNC: 11 MMOL/L (ref 8–16)
AST SERPL-CCNC: 20 U/L (ref 10–40)
BASOPHILS # BLD AUTO: 0.02 K/UL (ref 0–0.2)
BASOPHILS NFR BLD: 0.4 % (ref 0–1.9)
BILIRUB SERPL-MCNC: 0.4 MG/DL (ref 0.1–1)
BILIRUB UR QL STRIP: NEGATIVE
BNP SERPL-MCNC: 185 PG/ML (ref 0–99)
BUN SERPL-MCNC: 22 MG/DL (ref 8–23)
CALCIUM SERPL-MCNC: 9.7 MG/DL (ref 8.7–10.5)
CHLORIDE SERPL-SCNC: 108 MMOL/L (ref 95–110)
CK SERPL-CCNC: 53 U/L (ref 20–180)
CLARITY UR REFRACT.AUTO: CLEAR
CO2 SERPL-SCNC: 23 MMOL/L (ref 23–29)
COLOR UR AUTO: YELLOW
CREAT SERPL-MCNC: 1.4 MG/DL (ref 0.5–1.4)
DIFFERENTIAL METHOD BLD: ABNORMAL
EOSINOPHIL # BLD AUTO: 0 K/UL (ref 0–0.5)
EOSINOPHIL NFR BLD: 0.7 % (ref 0–8)
ERYTHROCYTE [DISTWIDTH] IN BLOOD BY AUTOMATED COUNT: 16.2 % (ref 11.5–14.5)
EST. GFR  (NO RACE VARIABLE): 39.2 ML/MIN/1.73 M^2
GLUCOSE SERPL-MCNC: 95 MG/DL (ref 70–110)
GLUCOSE UR QL STRIP: NEGATIVE
HCT VFR BLD AUTO: 35.5 % (ref 37–48.5)
HGB BLD-MCNC: 11.8 G/DL (ref 12–16)
HGB UR QL STRIP: NEGATIVE
IMM GRANULOCYTES # BLD AUTO: 0.01 K/UL (ref 0–0.04)
IMM GRANULOCYTES NFR BLD AUTO: 0.2 % (ref 0–0.5)
KETONES UR QL STRIP: NEGATIVE
LEUKOCYTE ESTERASE UR QL STRIP: NEGATIVE
LYMPHOCYTES # BLD AUTO: 1.2 K/UL (ref 1–4.8)
LYMPHOCYTES NFR BLD: 26.8 % (ref 18–48)
MCH RBC QN AUTO: 31.4 PG (ref 27–31)
MCHC RBC AUTO-ENTMCNC: 33.2 G/DL (ref 32–36)
MCV RBC AUTO: 94 FL (ref 82–98)
MONOCYTES # BLD AUTO: 0.4 K/UL (ref 0.3–1)
MONOCYTES NFR BLD: 9.7 % (ref 4–15)
NEUTROPHILS # BLD AUTO: 2.8 K/UL (ref 1.8–7.7)
NEUTROPHILS NFR BLD: 62.2 % (ref 38–73)
NITRITE UR QL STRIP: NEGATIVE
NRBC BLD-RTO: 0 /100 WBC
PH UR STRIP: 6 [PH] (ref 5–8)
PLATELET # BLD AUTO: 208 K/UL (ref 150–450)
PMV BLD AUTO: 11.2 FL (ref 9.2–12.9)
POTASSIUM SERPL-SCNC: 4.3 MMOL/L (ref 3.5–5.1)
PROT SERPL-MCNC: 7.7 G/DL (ref 6–8.4)
PROT UR QL STRIP: NEGATIVE
RBC # BLD AUTO: 3.76 M/UL (ref 4–5.4)
SODIUM SERPL-SCNC: 142 MMOL/L (ref 136–145)
SP GR UR STRIP: >=1.03 (ref 1–1.03)
TROPONIN I SERPL DL<=0.01 NG/ML-MCNC: <0.006 NG/ML (ref 0–0.03)
URN SPEC COLLECT METH UR: ABNORMAL
UROBILINOGEN UR STRIP-ACNC: NEGATIVE EU/DL
WBC # BLD AUTO: 4.52 K/UL (ref 3.9–12.7)

## 2024-08-22 PROCEDURE — 80053 COMPREHEN METABOLIC PANEL: CPT | Mod: ER | Performed by: EMERGENCY MEDICINE

## 2024-08-22 PROCEDURE — 85025 COMPLETE CBC W/AUTO DIFF WBC: CPT | Mod: ER | Performed by: EMERGENCY MEDICINE

## 2024-08-22 PROCEDURE — 81003 URINALYSIS AUTO W/O SCOPE: CPT | Mod: ER | Performed by: EMERGENCY MEDICINE

## 2024-08-22 PROCEDURE — 99285 EMERGENCY DEPT VISIT HI MDM: CPT | Mod: 25,ER

## 2024-08-22 PROCEDURE — 25000003 PHARM REV CODE 250: Mod: ER | Performed by: EMERGENCY MEDICINE

## 2024-08-22 PROCEDURE — 93010 ELECTROCARDIOGRAM REPORT: CPT | Mod: ,,, | Performed by: INTERNAL MEDICINE

## 2024-08-22 PROCEDURE — 83880 ASSAY OF NATRIURETIC PEPTIDE: CPT | Mod: ER | Performed by: EMERGENCY MEDICINE

## 2024-08-22 PROCEDURE — 82550 ASSAY OF CK (CPK): CPT | Mod: ER | Performed by: EMERGENCY MEDICINE

## 2024-08-22 PROCEDURE — 93005 ELECTROCARDIOGRAM TRACING: CPT | Mod: ER

## 2024-08-22 PROCEDURE — 84484 ASSAY OF TROPONIN QUANT: CPT | Mod: ER | Performed by: EMERGENCY MEDICINE

## 2024-08-22 PROCEDURE — 86803 HEPATITIS C AB TEST: CPT | Performed by: EMERGENCY MEDICINE

## 2024-08-22 PROCEDURE — 87389 HIV-1 AG W/HIV-1&-2 AB AG IA: CPT | Performed by: EMERGENCY MEDICINE

## 2024-08-22 RX ORDER — ACETAMINOPHEN 500 MG
1000 TABLET ORAL
Status: COMPLETED | OUTPATIENT
Start: 2024-08-22 | End: 2024-08-22

## 2024-08-22 RX ORDER — ACETAMINOPHEN 325 MG/1
325 TABLET ORAL EVERY 6 HOURS PRN
COMMUNITY
Start: 2024-08-22

## 2024-08-22 RX ADMIN — ACETAMINOPHEN 1000 MG: 500 TABLET ORAL at 07:08

## 2024-08-22 NOTE — ED PROVIDER NOTES
Encounter Date: 2024       History     Chief Complaint   Patient presents with    Swelling     R leg swelling, pain and 2 bruises, pt also complains of pain and knot to R hand , all onset today      The history is provided by the patient.   Swelling  This is a recurrent problem. The current episode started 6 to 12 hours ago. The problem occurs constantly. The problem has not changed since onset.Pertinent negatives include no chest pain, no abdominal pain, no headaches and no shortness of breath. The symptoms are aggravated by walking. Nothing relieves the symptoms.     Review of patient's allergies indicates:   Allergen Reactions    Ace inhibitors Swelling     Past Medical History:   Diagnosis Date    Allergic rhinitis     Arthritis     GERD (gastroesophageal reflux disease)     Hypertension     Vitamin D deficiency      Past Surgical History:   Procedure Laterality Date     SECTION       Family History   Problem Relation Name Age of Onset    Hypertension Mother       Social History     Tobacco Use    Smoking status: Never    Smokeless tobacco: Never   Substance Use Topics    Alcohol use: No    Drug use: No     Review of Systems   Constitutional:  Negative for fever.   HENT: Negative.  Negative for congestion and sore throat.    Eyes: Negative.    Respiratory: Negative.  Negative for cough and shortness of breath.    Cardiovascular:  Negative for chest pain.   Gastrointestinal:  Negative for abdominal pain, nausea and vomiting.   Genitourinary:  Negative for dysuria.   Neurological:  Negative for weakness, numbness and headaches.   Psychiatric/Behavioral:  Negative for confusion.        Physical Exam     Initial Vitals [24 1656]   BP Pulse Resp Temp SpO2   (!) 159/72 64 18 98 °F (36.7 °C) 97 %      MAP       --         Physical Exam    Constitutional: She appears well-developed and well-nourished. No distress.   HENT:   Head: Normocephalic and atraumatic.   Eyes: Conjunctivae are normal. Pupils  are equal, round, and reactive to light.   Neck: Neck supple.   Normal range of motion.  Cardiovascular:  Normal rate, regular rhythm and normal heart sounds.           Pulmonary/Chest: Breath sounds normal.   Abdominal: Abdomen is soft. Bowel sounds are normal. She exhibits no distension. There is no abdominal tenderness. There is no rebound.   Musculoskeletal:         General: Edema (1+) present. Normal range of motion.      Cervical back: Normal range of motion and neck supple.     Neurological: She is alert and oriented to person, place, and time. She has normal strength.   Skin: Skin is warm and dry.        Psychiatric: She has a normal mood and affect.         ED Course   Procedures  Labs Reviewed   CBC W/ AUTO DIFFERENTIAL - Abnormal       Result Value    WBC 4.52      RBC 3.76 (*)     Hemoglobin 11.8 (*)     Hematocrit 35.5 (*)     MCV 94      MCH 31.4 (*)     MCHC 33.2      RDW 16.2 (*)     Platelets 208      MPV 11.2      Immature Granulocytes 0.2      Gran # (ANC) 2.8      Immature Grans (Abs) 0.01      Lymph # 1.2      Mono # 0.4      Eos # 0.0      Baso # 0.02      nRBC 0      Gran % 62.2      Lymph % 26.8      Mono % 9.7      Eosinophil % 0.7      Basophil % 0.4      Differential Method Automated      Narrative:     Release to patient->Immediate   COMPREHENSIVE METABOLIC PANEL - Abnormal    Sodium 142      Potassium 4.3      Chloride 108      CO2 23      Glucose 95      BUN 22      Creatinine 1.4      Calcium 9.7      Total Protein 7.7      Albumin 3.5      Total Bilirubin 0.4      Alkaline Phosphatase 90      AST 20      ALT 12      eGFR 39.2 (*)     Anion Gap 11      Narrative:     Release to patient->Immediate   URINALYSIS, REFLEX TO URINE CULTURE - Abnormal    Specimen UA Urine, Clean Catch      Color, UA Yellow      Appearance, UA Clear      pH, UA 6.0      Specific Gravity, UA >=1.030 (*)     Protein, UA Negative      Glucose, UA Negative      Ketones, UA Negative      Bilirubin (UA) Negative       Occult Blood UA Negative      Nitrite, UA Negative      Urobilinogen, UA Negative      Leukocytes, UA Negative      Narrative:     Specimen Source->Urine   B-TYPE NATRIURETIC PEPTIDE - Abnormal     (*)     Narrative:     Release to patient->Immediate   HIV 1 / 2 ANTIBODY    HIV 1/2 Ag/Ab Negative      Narrative:     Release to patient->Immediate   HEPATITIS C ANTIBODY    Hepatitis C Ab Negative      Narrative:     Release to patient->Immediate   CK    CPK 53      Narrative:     Release to patient->Immediate   TROPONIN I    Troponin I <0.006      Narrative:     Release to patient->Immediate   HEP C VIRUS HOLD SPECIMEN     EKG Readings: (Independently Interpreted)   Rhythm: Normal Sinus Rhythm. Heart Rate: 61. Ectopy: No Ectopy. Conduction: Normal. ST Segments: Normal ST Segments. T Waves: Normal. Clinical Impression: Normal Sinus Rhythm       Imaging Results              X-Ray Tibia Fibula 2 View Right (Final result)  Result time 08/22/24 19:40:23      Final result by Beatriz Leigh MD (08/22/24 19:40:23)                   Impression:      Visualized knee prosthesis well aligned without atypia.  No acute fracture or dislocation seen.  Mild diffuse soft tissue edema.  No aggressive bony finding      Electronically signed by: Beatriz Leigh  Date:    08/22/2024  Time:    19:40               Narrative:    EXAMINATION:  XR TIBIA FIBULA 2 VIEW RIGHT    CLINICAL HISTORY:  Pain in leg, unspecified                                       X-Ray Chest AP Portable (Final result)  Result time 08/22/24 18:19:58      Final result by Beatriz Leigh MD (08/22/24 18:19:58)                   Impression:      No active pulmonary finding      Electronically signed by: Beatriz Leigh  Date:    08/22/2024  Time:    18:19               Narrative:    EXAMINATION:  XR CHEST AP PORTABLE    CLINICAL HISTORY:  leg swelling;    TECHNIQUE:  Single frontal portable view of the chest was  "performed.    COMPARISON:  None    FINDINGS:  No pulmonary consolidation or pleural effusion and no convincing pneumothorax                                       Medications   acetaminophen tablet 1,000 mg (1,000 mg Oral Given 8/22/24 1915)     Medical Decision Making  Amount and/or Complexity of Data Reviewed  Labs: ordered.  Radiology: ordered and independent interpretation performed.    Risk  OTC drugs.  Prescription drug management.  Parenteral controlled substances.  Risk Details: OTC drugs, prescription drugs and controlled substances considered.  Due to patient's symptoms improving and pain controlled pain medications ordered appropriately.  Differential diagnosis: Electrolyte abnormality, strains, sprain, fracture, contusion, syncope, sepsis, infection, arrhythmia, or dehydration.                      Vitals:    08/22/24 1656 08/22/24 1712 08/22/24 1732 08/22/24 1903   BP: (!) 159/72  125/64 (!) 144/83   Pulse: 64 62 64 60   Resp: 18  19    Temp: 98 °F (36.7 °C)      TempSrc: Oral      SpO2: 97%  96% 97%   Weight: 59 kg (130 lb)      Height: 4' 8" (1.422 m)       08/22/24 1904 08/22/24 1932   BP:  (!) 111/58   Pulse: 60 61   Resp: (!) 22 18   Temp:     TempSrc:     SpO2: 97% 98%   Weight:     Height:         Results for orders placed or performed during the hospital encounter of 08/22/24   HIV 1/2 Ag/Ab (4th Gen)   Result Value Ref Range    HIV 1/2 Ag/Ab Negative Negative   Hepatitis C Antibody   Result Value Ref Range    Hepatitis C Ab Negative Negative   CBC Auto Differential   Result Value Ref Range    WBC 4.52 3.90 - 12.70 K/uL    RBC 3.76 (L) 4.00 - 5.40 M/uL    Hemoglobin 11.8 (L) 12.0 - 16.0 g/dL    Hematocrit 35.5 (L) 37.0 - 48.5 %    MCV 94 82 - 98 fL    MCH 31.4 (H) 27.0 - 31.0 pg    MCHC 33.2 32.0 - 36.0 g/dL    RDW 16.2 (H) 11.5 - 14.5 %    Platelets 208 150 - 450 K/uL    MPV 11.2 9.2 - 12.9 fL    Immature Granulocytes 0.2 0.0 - 0.5 %    Gran # (ANC) 2.8 1.8 - 7.7 K/uL    Immature Grans (Abs) " 0.01 0.00 - 0.04 K/uL    Lymph # 1.2 1.0 - 4.8 K/uL    Mono # 0.4 0.3 - 1.0 K/uL    Eos # 0.0 0.0 - 0.5 K/uL    Baso # 0.02 0.00 - 0.20 K/uL    nRBC 0 0 /100 WBC    Gran % 62.2 38.0 - 73.0 %    Lymph % 26.8 18.0 - 48.0 %    Mono % 9.7 4.0 - 15.0 %    Eosinophil % 0.7 0.0 - 8.0 %    Basophil % 0.4 0.0 - 1.9 %    Differential Method Automated    Comprehensive Metabolic Panel   Result Value Ref Range    Sodium 142 136 - 145 mmol/L    Potassium 4.3 3.5 - 5.1 mmol/L    Chloride 108 95 - 110 mmol/L    CO2 23 23 - 29 mmol/L    Glucose 95 70 - 110 mg/dL    BUN 22 8 - 23 mg/dL    Creatinine 1.4 0.5 - 1.4 mg/dL    Calcium 9.7 8.7 - 10.5 mg/dL    Total Protein 7.7 6.0 - 8.4 g/dL    Albumin 3.5 3.5 - 5.2 g/dL    Total Bilirubin 0.4 0.1 - 1.0 mg/dL    Alkaline Phosphatase 90 55 - 135 U/L    AST 20 10 - 40 U/L    ALT 12 10 - 44 U/L    eGFR 39.2 (A) >60 mL/min/1.73 m^2    Anion Gap 11 8 - 16 mmol/L   Urinalysis, Reflex to Urine Culture Urine, Clean Catch    Specimen: Urine   Result Value Ref Range    Specimen UA Urine, Clean Catch     Color, UA Yellow Yellow, Straw, Karen    Appearance, UA Clear Clear    pH, UA 6.0 5.0 - 8.0    Specific Gravity, UA >=1.030 (A) 1.005 - 1.030    Protein, UA Negative Negative    Glucose, UA Negative Negative    Ketones, UA Negative Negative    Bilirubin (UA) Negative Negative    Occult Blood UA Negative Negative    Nitrite, UA Negative Negative    Urobilinogen, UA Negative <2.0 EU/dL    Leukocytes, UA Negative Negative   BNP   Result Value Ref Range     (H) 0 - 99 pg/mL   CK   Result Value Ref Range    CPK 53 20 - 180 U/L   Troponin I   Result Value Ref Range    Troponin I <0.006 0.000 - 0.026 ng/mL         Imaging Results              X-Ray Tibia Fibula 2 View Right (Final result)  Result time 08/22/24 19:40:23      Final result by Beatriz Leigh MD (08/22/24 19:40:23)                   Impression:      Visualized knee prosthesis well aligned without atypia.  No acute fracture or  dislocation seen.  Mild diffuse soft tissue edema.  No aggressive bony finding      Electronically signed by: Beatriz Leigh  Date:    08/22/2024  Time:    19:40               Narrative:    EXAMINATION:  XR TIBIA FIBULA 2 VIEW RIGHT    CLINICAL HISTORY:  Pain in leg, unspecified                                       X-Ray Chest AP Portable (Final result)  Result time 08/22/24 18:19:58      Final result by Beatriz Leigh MD (08/22/24 18:19:58)                   Impression:      No active pulmonary finding      Electronically signed by: Beatriz Leigh  Date:    08/22/2024  Time:    18:19               Narrative:    EXAMINATION:  XR CHEST AP PORTABLE    CLINICAL HISTORY:  leg swelling;    TECHNIQUE:  Single frontal portable view of the chest was performed.    COMPARISON:  None    FINDINGS:  No pulmonary consolidation or pleural effusion and no convincing pneumothorax                                      Medications   acetaminophen tablet 1,000 mg (1,000 mg Oral Given 8/22/24 1915)       6:00 PM - Transfer of Care: Patient care transferred from Dr. Villalta to Dr. Earl, pending studies.        6:13 PM  - Re-evaluation:  The patient is resting comfortably and is in no acute distress. Agree c Dr. Villalta's assessment.  Contusion on anterior portion of right shin/lower leg. Pt thinks she may have hit her leg on her walker.  Also pt has blister/callus on right hand from using her walker.  Skin intact.  No overt signs of infection.  No active bleeding/discharge. NVI distally. Discussed test results and notified of pending labs. Answered questions at this time.     7:54 PM - Re-evaluation: The patient is resting comfortably and is in no acute distress. She states that her symptoms have improved after treatment within ER. Discussed test results, shared treatment plan, specific conditions for return, and importance of follow up with patient and family.  Advised close f/u c pcp within one week and to return to  ER if any issues arise.  She understands and agrees with the plan as discussed. Answered  her questions at this time. She has remained hemodynamically stable throughout the ED course and is appropriate for discharge home.     Regarding CONTUSIONS, I advised patient to: REST the injured area or use it less than usual; apply ICE to decrease swelling and pain and help prevent tissue damage; use COMPRESSION with an elastic bandage to support the area and decrease swelling; ELEVATE injured body part above the level of the heart to help decrease pain and swelling; AVOID using massage or massage to acute injuries as it may slow healing of the area; AVOID drinking alcohol as it may slow healing of the injury; and avoid stretching injured muscles. Advised patient to return to the emergency department or contact primary care provider if: having trouble moving injured area; notice tingling or numbness in or near the injured area; extremity below the bruise gets cold or turns pale; a new lump develops in the injured area; symptoms do not improve with treatment after 4 to 5 days; there is any questions or concerns about the condition or treatment plan.     Pre-hypertension/Hypertension: The pt has been informed that they may have pre-hypertension or hypertension based on a blood pressure reading in the ED. I recommend that the pt call the PCP listed on their discharge instructions or a physician of their choice this week to arrange f/u for further evaluation of possible pre-hypertension or hypertension.     Kevin Knight was given a handout which discussed their disease process, precautions, and instructions for follow-up and therapy.     Follow-up Information       Markus Peterson MD. Schedule an appointment as soon as possible for a visit in 1 week.    Specialties: Internal Medicine, Family Medicine  Contact information:  [address] [address] [address] [phone]               [facility] - Emergency Dept.     Specialty: Emergency Medicine  Why: As needed, If symptoms worsen  Contact information:  [address]  Emergency Department  [facility] [id number] [phone]                              Medication List        START taking these medications      acetaminophen 325 MG tablet  Commonly known as: TYLENOL  Take 1 tablet (325 mg total) by mouth every 6 (six) hours as needed for Pain.            ASK your doctor about these medications      amLODIPine 10 MG tablet  Commonly known as: NORVASC  TAKE 1 TABLET (10 MG TOTAL) BY MOUTH ONCE DAILY.     aspirin 81 MG EC tablet  Commonly known as: ECOTRIN  Take 1 tablet (81 mg total) by mouth once daily.     hydroCHLOROthiazide 25 MG tablet  Commonly known as: HYDRODIURIL  Take 1 tablet (25 mg total) by mouth once daily.     risperiDONE 0.25 MG Tab  Commonly known as: RISPERDAL     simvastatin 20 MG tablet  Commonly known as: ZOCOR  Take 1 tablet (20 mg total) by mouth every evening.               Where to Get Your Medications        You can get these medications from any pharmacy    You don't need a prescription for these medications  acetaminophen 325 MG tablet        Current Discharge Medication List            ED Diagnosis  1. Blister (nonthermal) of right hand, initial encounter    2. Leg swelling    3. Leg pain    4. Contusion of multiple sites of right lower extremity, initial encounter                            Clinical Impression:  Final diagnoses:  [M79.89] Leg swelling  [M79.606] Leg pain  [S60.521A] Blister (nonthermal) of right hand, initial encounter (Primary)  [S80.11XA] Contusion of multiple sites of right lower extremity, initial encounter          ED Disposition Condition    Discharge Stable          ED Prescriptions       Medication Sig Dispense Start Date End Date Auth. Provider    acetaminophen (TYLENOL) 325 MG tablet Take 1 tablet (325 mg total) by mouth every 6 (six) hours as needed for Pain. -- 8/22/2024 -- Gabriel Earl Jr., MD           Follow-up Information       Follow up With Specialties Details Why Contact Info    Markus Peterson MD Internal Medicine, Family Medicine Schedule an appointment as soon as possible for a visit in 1 week  [address] [address] [address]  [phone]      [facility] Emergency Dept Emergency Medicine  As needed, If symptoms worsen [address] [place] [address]  Emergency Department  [facility] [id number] [phone]             Gabriel Earl Jr., MD  08/23/24 0323

## 2024-08-23 LAB
HCV AB SERPL QL IA: NEGATIVE
HEP C VIRUS HOLD SPECIMEN: NORMAL
HIV 1+2 AB+HIV1 P24 AG SERPL QL IA: NEGATIVE

## 2024-08-25 LAB
OHS QRS DURATION: 122 MS
OHS QTC CALCULATION: 440 MS

## 2024-08-28 ENCOUNTER — OFFICE VISIT (OUTPATIENT)
Dept: CARDIOLOGY | Facility: CLINIC | Age: 74
End: 2024-08-28
Payer: MEDICARE

## 2024-08-28 VITALS
HEART RATE: 62 BPM | OXYGEN SATURATION: 99 % | BODY MASS INDEX: 30.25 KG/M2 | SYSTOLIC BLOOD PRESSURE: 130 MMHG | WEIGHT: 134.94 LBS | DIASTOLIC BLOOD PRESSURE: 85 MMHG

## 2024-08-28 DIAGNOSIS — I25.118 CORONARY ARTERY DISEASE OF NATIVE ARTERY OF NATIVE HEART WITH STABLE ANGINA PECTORIS: ICD-10-CM

## 2024-08-28 DIAGNOSIS — Z95.0 CARDIAC PACEMAKER IN SITU: ICD-10-CM

## 2024-08-28 DIAGNOSIS — N18.31 CHRONIC KIDNEY DISEASE, STAGE 3A: ICD-10-CM

## 2024-08-28 DIAGNOSIS — I10 ESSENTIAL HYPERTENSION: ICD-10-CM

## 2024-08-28 DIAGNOSIS — I25.119 ATHEROSCLEROSIS OF NATIVE CORONARY ARTERY OF NATIVE HEART WITH ANGINA PECTORIS: ICD-10-CM

## 2024-08-28 DIAGNOSIS — I49.5 SICK SINUS SYNDROME: ICD-10-CM

## 2024-08-28 DIAGNOSIS — R60.0 LOCALIZED EDEMA: ICD-10-CM

## 2024-08-28 DIAGNOSIS — R07.89 OTHER CHEST PAIN: Primary | ICD-10-CM

## 2024-08-28 PROCEDURE — 1159F MED LIST DOCD IN RCRD: CPT | Mod: CPTII,S$GLB,, | Performed by: INTERNAL MEDICINE

## 2024-08-28 PROCEDURE — 1160F RVW MEDS BY RX/DR IN RCRD: CPT | Mod: CPTII,S$GLB,, | Performed by: INTERNAL MEDICINE

## 2024-08-28 PROCEDURE — 1100F PTFALLS ASSESS-DOCD GE2>/YR: CPT | Mod: CPTII,S$GLB,, | Performed by: INTERNAL MEDICINE

## 2024-08-28 PROCEDURE — 99214 OFFICE O/P EST MOD 30 MIN: CPT | Mod: S$GLB,,, | Performed by: INTERNAL MEDICINE

## 2024-08-28 PROCEDURE — G2211 COMPLEX E/M VISIT ADD ON: HCPCS | Mod: S$GLB,,, | Performed by: INTERNAL MEDICINE

## 2024-08-28 PROCEDURE — 1126F AMNT PAIN NOTED NONE PRSNT: CPT | Mod: CPTII,S$GLB,, | Performed by: INTERNAL MEDICINE

## 2024-08-28 PROCEDURE — 3044F HG A1C LEVEL LT 7.0%: CPT | Mod: CPTII,S$GLB,, | Performed by: INTERNAL MEDICINE

## 2024-08-28 PROCEDURE — 99999 PR PBB SHADOW E&M-EST. PATIENT-LVL III: CPT | Mod: PBBFAC,,, | Performed by: INTERNAL MEDICINE

## 2024-08-28 PROCEDURE — 3079F DIAST BP 80-89 MM HG: CPT | Mod: CPTII,S$GLB,, | Performed by: INTERNAL MEDICINE

## 2024-08-28 PROCEDURE — 3288F FALL RISK ASSESSMENT DOCD: CPT | Mod: CPTII,S$GLB,, | Performed by: INTERNAL MEDICINE

## 2024-08-28 PROCEDURE — 3075F SYST BP GE 130 - 139MM HG: CPT | Mod: CPTII,S$GLB,, | Performed by: INTERNAL MEDICINE

## 2024-08-28 RX ORDER — HYDROCHLOROTHIAZIDE 25 MG/1
25 TABLET ORAL DAILY
Qty: 90 TABLET | Refills: 1 | Status: SHIPPED | OUTPATIENT
Start: 2024-08-28

## 2024-08-28 NOTE — PROGRESS NOTES
Subjective:   Patient ID:  Kevin Knight is a 74 y.o. female who presents for cardiac consult of Shortness of Breath      Referral by:   Valencia Vidal, NEAL     Reason for consult: CAD, CP      HPI  The patient came in today for cardiac consult of Shortness of Breath      Kevin Knight is a 74 y.o. female pt with CAD, HTN, HLD, SSS s/p PPM, anemia, depression, Vit D def, GERD, Lewy Body Dementia presents for CVD eval.     5/15/24  BP and HR stable today. BMI 30 - 134 lbs     From PCP office - Mrs. Knight returns to clinic with her sister for HTN follow up and to review labs. At last visit, she presented to [facility] care.   Medical history hypertension, CAD, CKD, and Lewy body dementia with hallucinations. BP has improved since starting HCTZ with amlodipine. Home readings reportedly 130s. Cholesterol was elevated due to not taking atorvastatin. Reported side effects with atorvastation. She was also noted to be anemic. Not taking any supplements. She c/o left chest pain which  started today and ongoing constipation     She had a pacemaker implanted last year for SSS - unsure details.   SHe has intermittent CP - unable to describe fully.       8/28/24 8/22/2024 - ER eval     History           Chief Complaint   Patient presents with    Swelling       R leg swelling, pain and 2 bruises, pt also complains of pain and knot to R hand , all onset today       The history is provided by the patient.   Swelling  This is a recurrent problem. The current episode started 6 to 12 hours ago. The problem occurs constantly. The problem has not changed since onset.Pertinent negatives include no chest pain, no abdominal pain, no headaches and no shortness of breath. The symptoms are aggravated by walking. Nothing relieves the symptoms.        Pt was recently at our ER last week for L leg swelling.     X ray of knee was neg for fracture.     BNP   Date Value   08/22/2024 185 pg/mL (H)   03/23/2021 109 PG/ML    2021 78 PG/ML      BNP was elevated 185, trop neg.     ECHO and Nuc stress are pending.   BP and Hr stable today. BMI 30 - 134 lbs         No cardiac monitor results found for the past 12 months     Ventricular-paced rhythm   Abnormal ECG   No previous ECGs available   Confirmed by GET NAPIER MD ([id number]) on 2024 4:04:45 PM     Referred By: LENA GONZALEZ           Confirmed By:GET NAPIER MD     Past Medical History:   Diagnosis Date    Allergic rhinitis     Arthritis     GERD (gastroesophageal reflux disease)     Hypertension     Vitamin D deficiency        Past Surgical History:   Procedure Laterality Date     SECTION         Social History     Tobacco Use    Smoking status: Never    Smokeless tobacco: Never   Substance Use Topics    Alcohol use: No    Drug use: No       Family History   Problem Relation Name Age of Onset    Hypertension Mother         Patient's Medications   New Prescriptions    No medications on file   Previous Medications    ACETAMINOPHEN (TYLENOL) 325 MG TABLET    Take 1 tablet (325 mg total) by mouth every 6 (six) hours as needed for Pain.    AMLODIPINE (NORVASC) 10 MG TABLET    TAKE 1 TABLET (10 MG TOTAL) BY MOUTH ONCE DAILY.    ASPIRIN (ECOTRIN) 81 MG EC TABLET    Take 1 tablet (81 mg total) by mouth once daily.    HYDROCHLOROTHIAZIDE (HYDRODIURIL) 25 MG TABLET    Take 1 tablet (25 mg total) by mouth once daily.    RISPERIDONE (RISPERDAL) 0.25 MG TAB    Take by mouth.    SIMVASTATIN (ZOCOR) 20 MG TABLET    Take 1 tablet (20 mg total) by mouth every evening.   Modified Medications    No medications on file   Discontinued Medications    No medications on file       Review of Systems   Constitutional:  Positive for malaise/fatigue.   HENT: Negative.     Eyes: Negative.    Respiratory: Negative.     Cardiovascular:  Positive for chest pain and leg swelling.   Gastrointestinal: Negative.    Genitourinary: Negative.    Musculoskeletal:  Positive for joint pain.   Skin:  Negative.    Neurological: Negative.    Endo/Heme/Allergies: Negative.    Psychiatric/Behavioral: Negative.     All 12 systems otherwise negative.      Wt Readings from Last 3 Encounters:   08/28/24 61.2 kg (134 lb 14.7 oz)   08/22/24 59 kg (130 lb)   05/15/24 61.1 kg (134 lb 11.2 oz)     Temp Readings from Last 3 Encounters:   08/22/24 98 °F (36.7 °C) (Oral)   05/08/24 98.1 °F (36.7 °C)   04/24/24 97.1 °F (36.2 °C)     BP Readings from Last 3 Encounters:   08/28/24 130/85   08/22/24 (!) 111/58   05/15/24 106/78     Pulse Readings from Last 3 Encounters:   08/28/24 62   08/22/24 61   05/15/24 60       /85 (BP Location: Right arm, Patient Position: Sitting, BP Method: Medium (Manual))   Pulse 62   Wt 61.2 kg (134 lb 14.7 oz)   SpO2 99%   BMI 30.25 kg/m²     Objective:   Physical Exam  Vitals and nursing note reviewed.   Constitutional:       General: She is not in acute distress.     Appearance: She is well-developed. She is obese. She is not diaphoretic.   HENT:      Head: Normocephalic and atraumatic.      Nose: Nose normal.   Eyes:      General: No scleral icterus.     Conjunctiva/sclera: Conjunctivae normal.   Neck:      Thyroid: No thyromegaly.      Vascular: No JVD.   Cardiovascular:      Rate and Rhythm: Normal rate and regular rhythm.      Heart sounds: S1 normal and S2 normal. Murmur heard.      No friction rub. No gallop. No S3 or S4 sounds.   Pulmonary:      Effort: Pulmonary effort is normal. No respiratory distress.      Breath sounds: Normal breath sounds. No stridor. No wheezing or rales.   Chest:      Chest wall: No tenderness.   Abdominal:      General: Bowel sounds are normal. There is no distension.      Palpations: Abdomen is soft. There is no mass.      Tenderness: There is no abdominal tenderness. There is no rebound.   Genitourinary:     Comments: Deferred  Musculoskeletal:         General: No tenderness or deformity. Normal range of motion.      Cervical back: Normal range of motion  and neck supple.      Right lower leg: Edema present.      Left lower leg: Edema present.   Lymphadenopathy:      Cervical: No cervical adenopathy.   Skin:     General: Skin is warm and dry.      Coloration: Skin is not pale.      Findings: No erythema or rash.   Neurological:      Mental Status: She is alert and oriented to person, place, and time.      Motor: No abnormal muscle tone.      Coordination: Coordination normal.   Psychiatric:         Behavior: Behavior normal.         Thought Content: Thought content normal.         Judgment: Judgment normal.         Lab Results   Component Value Date     08/22/2024    K 4.3 08/22/2024     08/22/2024    CO2 23 08/22/2024    BUN 22 08/22/2024    CREATININE 1.4 08/22/2024    GLU 95 08/22/2024    HGBA1C 4.9 04/24/2024    AST 20 08/22/2024    ALT 12 08/22/2024    ALBUMIN 3.5 08/22/2024    PROT 7.7 08/22/2024    BILITOT 0.4 08/22/2024    WBC 4.52 08/22/2024    HGB 11.8 (L) 08/22/2024    HCT 35.5 (L) 08/22/2024    MCV 94 08/22/2024     08/22/2024    INR 0.9 03/09/2021    TSH 1.208 04/24/2024    CHOL 241 (H) 04/24/2024    HDL 91 (H) 04/24/2024    LDLCALC 126.8 04/24/2024    LDLCALC 96 05/01/2020    TRIG 116 04/24/2024     (H) 08/22/2024         BNP   Date Value   08/22/2024 185 pg/mL (H)   03/23/2021 109 PG/ML   03/23/2021 78 PG/ML     INR (no units)   Date Value   03/09/2021 0.9          Assessment:      1. Localized edema    2. Sick sinus syndrome    3. Essential hypertension    4. Atherosclerosis of native coronary artery of native heart with angina pectoris    5. Other chest pain    6. Coronary artery disease of native artery of native heart with stable angina pectoris    7. Chronic kidney disease, stage 3a    8. Cardiac pacemaker in situ          Plan:     CAD, CP  - cont asa, statin  - order pharm nuclear stress test, pt cannot walk on treadmill due to joint issues  - order ECHO  - if neg CV workup rule out non cardiac etiologies     2. HTN, ?  SSS - s/p PPM  - titrate meds  - pacemaker clinic needs to send transmissions to [facility]     3. HLD - elevated recently  - cont statin and titrate  - if remains elevated will adjust dose - stopped statin recently at [facility]    4. CKD3  - cont to monitor     5. Edema/swelling  - recent BNP elevated   - cont HCTZ - increase dose as needed     6. Obesity - BMI 30 - 134 lbs   - cont weight loss      Visit today included increased complexity associated with the care of the episodic problem chest pain addressed and managing the longitudinal care of the patient due to the serious and/or complex managed problem(s) .    Thank you for allowing me to participate in this patient's care. Please do not hesitate to contact me with any questions or concerns. Consult note has been forwarded to the referral physician.

## 2024-09-26 ENCOUNTER — HOSPITAL ENCOUNTER (OUTPATIENT)
Dept: RADIOLOGY | Facility: HOSPITAL | Age: 74
Discharge: HOME | End: 2024-09-26
Attending: INTERNAL MEDICINE
Payer: MEDICARE

## 2024-09-26 ENCOUNTER — HOSPITAL ENCOUNTER (OUTPATIENT)
Dept: CARDIOLOGY | Facility: HOSPITAL | Age: 74
Discharge: HOME | End: 2024-09-26
Attending: INTERNAL MEDICINE
Payer: MEDICARE

## 2024-09-26 ENCOUNTER — CLINICAL SUPPORT (OUTPATIENT)
Dept: CARDIOLOGY | Facility: HOSPITAL | Age: 74
End: 2024-09-26
Attending: INTERNAL MEDICINE
Payer: MEDICARE

## 2024-09-26 VITALS
BODY MASS INDEX: 30.14 KG/M2 | SYSTOLIC BLOOD PRESSURE: 106 MMHG | WEIGHT: 134 LBS | HEIGHT: 56 IN | DIASTOLIC BLOOD PRESSURE: 78 MMHG

## 2024-09-26 DIAGNOSIS — R60.0 LOCALIZED EDEMA: ICD-10-CM

## 2024-09-26 DIAGNOSIS — Z95.0 CARDIAC PACEMAKER IN SITU: ICD-10-CM

## 2024-09-26 DIAGNOSIS — I25.119 ATHEROSCLEROSIS OF NATIVE CORONARY ARTERY OF NATIVE HEART WITH ANGINA PECTORIS: ICD-10-CM

## 2024-09-26 DIAGNOSIS — I10 ESSENTIAL HYPERTENSION: ICD-10-CM

## 2024-09-26 DIAGNOSIS — I49.5 SICK SINUS SYNDROME: ICD-10-CM

## 2024-09-26 DIAGNOSIS — R07.89 OTHER CHEST PAIN: ICD-10-CM

## 2024-09-26 DIAGNOSIS — N18.31 CHRONIC KIDNEY DISEASE, STAGE 3A: ICD-10-CM

## 2024-09-26 DIAGNOSIS — I25.118 CORONARY ARTERY DISEASE OF NATIVE ARTERY OF NATIVE HEART WITH STABLE ANGINA PECTORIS: ICD-10-CM

## 2024-09-26 LAB
AORTIC ROOT ANNULUS: 3.32 CM
AV INDEX (PROSTH): 1.03
AV MEAN GRADIENT: 3 MMHG
AV PEAK GRADIENT: 6 MMHG
AV REGURGITATION PRESSURE HALF TIME: 856.31 MS
AV VALVE AREA BY VELOCITY RATIO: 2.64 CM²
AV VALVE AREA: 3.24 CM²
AV VELOCITY RATIO: 0.84
BSA FOR ECHO PROCEDURE: 1.55 M2
CV ECHO LV RWT: 0.47 CM
DOP CALC AO PEAK VEL: 1.2 M/S
DOP CALC AO VTI: 22.4 CM
DOP CALC LVOT AREA: 3.1 CM2
DOP CALC LVOT DIAMETER: 2 CM
DOP CALC LVOT PEAK VEL: 1.01 M/S
DOP CALC LVOT STROKE VOLUME: 72.53 CM3
DOP CALC RVOT PEAK VEL: 0.74 M/S
DOP CALC RVOT VTI: 14.7 CM
DOP CALCLVOT PEAK VEL VTI: 23.1 CM
E WAVE DECELERATION TIME: 224.88 MSEC
E/A RATIO: 0.72
E/E' RATIO: 10 M/S
ECHO LV POSTERIOR WALL: 0.98 CM (ref 0.6–1.1)
EJECTION FRACTION: 60 %
FRACTIONAL SHORTENING: 38 % (ref 28–44)
INTERVENTRICULAR SEPTUM: 0.88 CM (ref 0.6–1.1)
IVRT: 98.95 MSEC
LA MAJOR: 5.1 CM
LA MINOR: 4.54 CM
LA WIDTH: 3.3 CM
LEFT ATRIUM AREA SYSTOLIC (APICAL 2 CHAMBER): 16.06 CM2
LEFT ATRIUM AREA SYSTOLIC (APICAL 4 CHAMBER): 19.54 CM2
LEFT ATRIUM SIZE: 4.14 CM
LEFT ATRIUM VOLUME INDEX MOD: 33 ML/M2
LEFT ATRIUM VOLUME INDEX: 37.2 ML/M2
LEFT ATRIUM VOLUME MOD: 49.54 ML
LEFT ATRIUM VOLUME: 55.78 CM3
LEFT INTERNAL DIMENSION IN SYSTOLE: 2.6 CM (ref 2.1–4)
LEFT VENTRICLE DIASTOLIC VOLUME INDEX: 52.53 ML/M2
LEFT VENTRICLE DIASTOLIC VOLUME: 78.8 ML
LEFT VENTRICLE END SYSTOLIC VOLUME APICAL 2 CHAMBER: 41.28 ML
LEFT VENTRICLE END SYSTOLIC VOLUME APICAL 4 CHAMBER: 51.57 ML
LEFT VENTRICLE MASS INDEX: 83 G/M2
LEFT VENTRICLE SYSTOLIC VOLUME INDEX: 16.5 ML/M2
LEFT VENTRICLE SYSTOLIC VOLUME: 24.69 ML
LEFT VENTRICULAR INTERNAL DIMENSION IN DIASTOLE: 4.2 CM (ref 3.5–6)
LEFT VENTRICULAR MASS: 124.12 G
LV LATERAL E/E' RATIO: 10 M/S
LV SEPTAL E/E' RATIO: 10 M/S
LVED V (TEICH): 78.8 ML
LVES V (TEICH): 24.69 ML
LVOT MG: 1.79 MMHG
LVOT MV: 0.62 CM/S
MV PEAK A VEL: 0.83 M/S
MV PEAK E VEL: 0.6 M/S
OHS CV DC REMOTE DEVICE TYPE: NORMAL
OHS CV RV PACING PERCENT: 27 %
OHS CV RV/LV RATIO: 0.64 CM
PISA AR MAX VEL: 3.09 M/S
PISA TR MAX VEL: 3.18 M/S
PV MEAN GRADIENT: 1 MMHG
PV MV: 0.53 M/S
PV PEAK GRADIENT: 2 MMHG
PV PEAK VELOCITY: 0.79 M/S
RA MAJOR: 4.75 CM
RA PRESSURE ESTIMATED: 3 MMHG
RA WIDTH: 2.74 CM
RIGHT VENTRICULAR END-DIASTOLIC DIMENSION: 2.68 CM
RV TB RVSP: 6 MMHG
SINUS: 2.79 CM
STJ: 2.62 CM
TDI LATERAL: 0.06 M/S
TDI SEPTAL: 0.06 M/S
TDI: 0.06 M/S
TR MAX PG: 40 MMHG
TV REST PULMONARY ARTERY PRESSURE: 43 MMHG
Z-SCORE OF LEFT VENTRICULAR DIMENSION IN END DIASTOLE: -0.54
Z-SCORE OF LEFT VENTRICULAR DIMENSION IN END SYSTOLE: -0.45

## 2024-09-26 PROCEDURE — 93306 TTE W/DOPPLER COMPLETE: CPT

## 2024-09-26 PROCEDURE — 93017 CV STRESS TEST TRACING ONLY: CPT

## 2024-09-26 PROCEDURE — 99999 PR PBB SHADOW E&M-EST. PATIENT-LVL II: CPT | Mod: PBBFAC,,,

## 2024-09-26 PROCEDURE — A9502 TC99M TETROFOSMIN: HCPCS | Performed by: INTERNAL MEDICINE

## 2024-09-26 PROCEDURE — 78452 HT MUSCLE IMAGE SPECT MULT: CPT

## 2024-09-26 PROCEDURE — 93288 INTERROG EVL PM/LDLS PM IP: CPT

## 2024-09-26 PROCEDURE — 63600175 PHARM REV CODE 636 W HCPCS: Performed by: INTERNAL MEDICINE

## 2024-09-26 PROCEDURE — 93306 TTE W/DOPPLER COMPLETE: CPT | Mod: 26,,, | Performed by: INTERNAL MEDICINE

## 2024-09-26 RX ORDER — REGADENOSON 0.08 MG/ML
0.4 INJECTION, SOLUTION INTRAVENOUS ONCE
Status: COMPLETED | OUTPATIENT
Start: 2024-09-26 | End: 2024-09-26

## 2024-09-26 RX ADMIN — TETROFOSMIN 30.6 MILLICURIE: 1.38 INJECTION, POWDER, LYOPHILIZED, FOR SOLUTION INTRAVENOUS at 01:09

## 2024-09-26 RX ADMIN — TETROFOSMIN 9 MILLICURIE: 1.38 INJECTION, POWDER, LYOPHILIZED, FOR SOLUTION INTRAVENOUS at 12:09

## 2024-09-26 RX ADMIN — REGADENOSON 0.4 MG: 0.08 INJECTION, SOLUTION INTRAVENOUS at 01:09

## 2024-09-27 LAB
CV STRESS BASE HR: 67 BPM
DIASTOLIC BLOOD PRESSURE: 89 MMHG
NUC REST EJECTION FRACTION: 66
NUC STRESS EJECTION FRACTION: 75 %
OHS CV CPX 85 PERCENT MAX PREDICTED HEART RATE MALE: 123
OHS CV CPX MAX PREDICTED HEART RATE: 145
OHS CV CPX PATIENT IS FEMALE: 1
OHS CV CPX PATIENT IS MALE: 0
OHS CV CPX PEAK DIASTOLIC BLOOD PRESSURE: 71 MMHG
OHS CV CPX PEAK HEAR RATE: 99 BPM
OHS CV CPX PEAK RATE PRESSURE PRODUCT: NORMAL
OHS CV CPX PEAK SYSTOLIC BLOOD PRESSURE: 146 MMHG
OHS CV CPX PERCENT MAX PREDICTED HEART RATE ACHIEVED: 71
OHS CV CPX RATE PRESSURE PRODUCT PRESENTING: NORMAL
OHS CV INITIAL DOSE: 9.1 MCG/KG/MIN
OHS CV PEAK DOSE: 30.6 MCG/KG/MIN
SYSTOLIC BLOOD PRESSURE: 151 MMHG

## 2024-11-06 RX ORDER — AMLODIPINE BESYLATE 10 MG/1
10 TABLET ORAL DAILY
Qty: 90 TABLET | Refills: 0 | Status: SHIPPED | OUTPATIENT
Start: 2024-11-06

## 2024-11-06 NOTE — TELEPHONE ENCOUNTER
No care due was identified.  [facility] Embedded Care Due Messages. Reference number: [id number].   11/06/2024 9:15:14 AM CST

## 2024-12-11 ENCOUNTER — HOSPITAL ENCOUNTER (OUTPATIENT)
Facility: HOSPITAL | Age: 74
Discharge: HOME | End: 2024-12-11
Attending: EMERGENCY MEDICINE | Admitting: SPECIALIST
Payer: MEDICARE

## 2024-12-11 VITALS
DIASTOLIC BLOOD PRESSURE: 69 MMHG | OXYGEN SATURATION: 98 % | BODY MASS INDEX: 32.6 KG/M2 | SYSTOLIC BLOOD PRESSURE: 134 MMHG | TEMPERATURE: 97 F | WEIGHT: 144.94 LBS | HEART RATE: 84 BPM | HEIGHT: 56 IN | RESPIRATION RATE: 20 BRPM

## 2024-12-11 DIAGNOSIS — R07.9 CHEST PAIN: ICD-10-CM

## 2024-12-11 DIAGNOSIS — R05.9 COUGH: ICD-10-CM

## 2024-12-11 DIAGNOSIS — D69.6 THROMBOCYTOPENIA: Primary | ICD-10-CM

## 2024-12-11 LAB
ALBUMIN SERPL BCP-MCNC: 3.4 G/DL (ref 3.5–5.2)
ALP SERPL-CCNC: 121 U/L (ref 40–150)
ALT SERPL W/O P-5'-P-CCNC: 42 U/L (ref 10–44)
ANION GAP SERPL CALC-SCNC: 12 MMOL/L (ref 8–16)
APTT PPP: 35.9 SEC (ref 21–32)
AST SERPL-CCNC: 29 U/L (ref 10–40)
BASOPHILS # BLD AUTO: 0.01 K/UL (ref 0–0.2)
BASOPHILS NFR BLD: 0.2 % (ref 0–1.9)
BILIRUB SERPL-MCNC: 0.4 MG/DL (ref 0.1–1)
BILIRUB UR QL STRIP: NEGATIVE
BNP SERPL-MCNC: 116 PG/ML (ref 0–99)
BUN SERPL-MCNC: 25 MG/DL (ref 8–23)
CALCIUM SERPL-MCNC: 10.6 MG/DL (ref 8.7–10.5)
CHLORIDE SERPL-SCNC: 104 MMOL/L (ref 95–110)
CLARITY UR REFRACT.AUTO: CLEAR
CO2 SERPL-SCNC: 26 MMOL/L (ref 23–29)
COLOR UR AUTO: YELLOW
CREAT SERPL-MCNC: 1.4 MG/DL (ref 0.5–1.4)
DIFFERENTIAL METHOD BLD: ABNORMAL
EOSINOPHIL # BLD AUTO: 0 K/UL (ref 0–0.5)
EOSINOPHIL NFR BLD: 0.5 % (ref 0–8)
ERYTHROCYTE [DISTWIDTH] IN BLOOD BY AUTOMATED COUNT: 14.7 % (ref 11.5–14.5)
EST. GFR  (NO RACE VARIABLE): 39.2 ML/MIN/1.73 M^2
GIANT PLATELETS BLD QL SMEAR: PRESENT
GLUCOSE SERPL-MCNC: 88 MG/DL (ref 70–110)
GLUCOSE UR QL STRIP: NEGATIVE
HCT VFR BLD AUTO: 37.8 % (ref 37–48.5)
HGB BLD-MCNC: 13 G/DL (ref 12–16)
HGB UR QL STRIP: NEGATIVE
IMM GRANULOCYTES # BLD AUTO: 0.01 K/UL (ref 0–0.04)
IMM GRANULOCYTES NFR BLD AUTO: 0.2 % (ref 0–0.5)
INR PPP: 0.9 (ref 0.8–1.2)
KETONES UR QL STRIP: NEGATIVE
LACTATE SERPL-SCNC: 0.8 MMOL/L (ref 0.5–2.2)
LEUKOCYTE ESTERASE UR QL STRIP: NEGATIVE
LYMPHOCYTES # BLD AUTO: 1.2 K/UL (ref 1–4.8)
LYMPHOCYTES NFR BLD: 19.4 % (ref 18–48)
MCH RBC QN AUTO: 32 PG (ref 27–31)
MCHC RBC AUTO-ENTMCNC: 34.4 G/DL (ref 32–36)
MCV RBC AUTO: 93 FL (ref 82–98)
MONOCYTES # BLD AUTO: 0.3 K/UL (ref 0.3–1)
MONOCYTES NFR BLD: 4.7 % (ref 4–15)
NEUTROPHILS # BLD AUTO: 4.5 K/UL (ref 1.8–7.7)
NEUTROPHILS NFR BLD: 75 % (ref 38–73)
NITRITE UR QL STRIP: NEGATIVE
NRBC BLD-RTO: 0 /100 WBC
PH UR STRIP: 7 [PH] (ref 5–8)
PLATELET # BLD AUTO: 90 K/UL (ref 150–450)
PLATELET BLD QL SMEAR: ABNORMAL
PMV BLD AUTO: 12.9 FL (ref 9.2–12.9)
POCT GLUCOSE: 86 MG/DL (ref 70–110)
POTASSIUM SERPL-SCNC: 3.8 MMOL/L (ref 3.5–5.1)
PROCALCITONIN SERPL IA-MCNC: 0.17 NG/ML
PROT SERPL-MCNC: 8.6 G/DL (ref 6–8.4)
PROT UR QL STRIP: NEGATIVE
PROTHROMBIN TIME: 10.3 SEC (ref 9–12.5)
RBC # BLD AUTO: 4.06 M/UL (ref 4–5.4)
SODIUM SERPL-SCNC: 142 MMOL/L (ref 136–145)
SP GR UR STRIP: 1.01 (ref 1–1.03)
TROPONIN I SERPL DL<=0.01 NG/ML-MCNC: 0.01 NG/ML (ref 0–0.03)
TROPONIN I SERPL DL<=0.01 NG/ML-MCNC: 0.01 NG/ML (ref 0–0.03)
TSH SERPL DL<=0.005 MIU/L-ACNC: 3.66 UIU/ML (ref 0.4–4)
URN SPEC COLLECT METH UR: NORMAL
UROBILINOGEN UR STRIP-ACNC: NEGATIVE EU/DL
WBC # BLD AUTO: 5.97 K/UL (ref 3.9–12.7)

## 2024-12-11 PROCEDURE — 83605 ASSAY OF LACTIC ACID: CPT | Mod: ER | Performed by: EMERGENCY MEDICINE

## 2024-12-11 PROCEDURE — 83880 ASSAY OF NATRIURETIC PEPTIDE: CPT | Mod: ER | Performed by: EMERGENCY MEDICINE

## 2024-12-11 PROCEDURE — 81003 URINALYSIS AUTO W/O SCOPE: CPT | Mod: ER | Performed by: EMERGENCY MEDICINE

## 2024-12-11 PROCEDURE — 85610 PROTHROMBIN TIME: CPT | Mod: ER | Performed by: EMERGENCY MEDICINE

## 2024-12-11 PROCEDURE — 93010 ELECTROCARDIOGRAM REPORT: CPT | Mod: ,,, | Performed by: INTERNAL MEDICINE

## 2024-12-11 PROCEDURE — 87040 BLOOD CULTURE FOR BACTERIA: CPT | Mod: 59 | Performed by: EMERGENCY MEDICINE

## 2024-12-11 PROCEDURE — 84484 ASSAY OF TROPONIN QUANT: CPT | Mod: ER | Performed by: EMERGENCY MEDICINE

## 2024-12-11 PROCEDURE — 84443 ASSAY THYROID STIM HORMONE: CPT | Mod: ER | Performed by: EMERGENCY MEDICINE

## 2024-12-11 PROCEDURE — 85025 COMPLETE CBC W/AUTO DIFF WBC: CPT | Mod: ER | Performed by: EMERGENCY MEDICINE

## 2024-12-11 PROCEDURE — 99285 EMERGENCY DEPT VISIT HI MDM: CPT | Mod: 25,ER

## 2024-12-11 PROCEDURE — 94761 N-INVAS EAR/PLS OXIMETRY MLT: CPT | Mod: ER

## 2024-12-11 PROCEDURE — 84145 PROCALCITONIN (PCT): CPT | Mod: ER | Performed by: EMERGENCY MEDICINE

## 2024-12-11 PROCEDURE — 85730 THROMBOPLASTIN TIME PARTIAL: CPT | Mod: ER | Performed by: EMERGENCY MEDICINE

## 2024-12-11 PROCEDURE — G0378 HOSPITAL OBSERVATION PER HR: HCPCS | Mod: ER

## 2024-12-11 PROCEDURE — 82962 GLUCOSE BLOOD TEST: CPT | Mod: ER

## 2024-12-11 PROCEDURE — 93005 ELECTROCARDIOGRAM TRACING: CPT | Mod: ER

## 2024-12-11 PROCEDURE — 80053 COMPREHEN METABOLIC PANEL: CPT | Mod: ER | Performed by: EMERGENCY MEDICINE

## 2024-12-11 NOTE — Clinical Note
Diagnosis: Chest pain [112544]   Future Attending Provider: SAMANTHA JOVEL [[id number]]   Is the patient being admitted to ED TeleObservation?: No   Is the Patient being Admitted to ED Teleobservation? If the answer is No: Patient acuity level too high

## 2024-12-11 NOTE — ED PROVIDER NOTES
Emergency Medicine Provider Note - 2024       History     Chief Complaint   Patient presents with    Abnormal Labs     Pt c/o left arm pain, SOB, coughing since yesterday. Pt had outpatient labs done yesterday and told to come to ER. Pt denies CP, NV, abd pain, dysuria, fever, chills. Resp e/u, ambulatory with walker.        Allergies:  Review of patient's allergies indicates:   Allergen Reactions    Ace inhibitors Swelling    Lisinopril         History of Present Illness   HPI    2024, 2:07 PM  The history is provided by the  old chart  and patient    Accompanied by son Steven.    Kevin Knight is a 74 y.o. female presenting to the ED for abnormal lab.  Patient had blood work performed yesterday and was referred to the emergency department for abnormal troponin.  Patient's troponin was 16.  Normal is less than 14.  Patient was seen by her primary care physician yesterday secondary to left arm pain, chest pain that is always there and cough.  Patient has history of dementia.  Poor historian.  Previous workup includes: nuclear stress test 2024 negative for ischemia.  Echocardiogram performed in 2024 which showed normal ejection fraction.      Son is at bedside.  He denies any episodes diaphoresis, nausea, vomiting, passing out, or poor looking color.    Arrival mode: Private Vehicle     PCP: Markus Peterson MD     Past Medical History:  Past Medical History:   Diagnosis Date    Allergic rhinitis     Arthritis     GERD (gastroesophageal reflux disease)     Hypertension     Vitamin D deficiency        Past Surgical History:  Past Surgical History:   Procedure Laterality Date     SECTION           Family History:  Family History   Problem Relation Name Age of Onset    Hypertension Mother         Social History:  Social History     Tobacco Use    Smoking status: Never    Smokeless tobacco: Never   Substance and Sexual Activity    Alcohol use: No    Drug use: No    Sexual  activity: Not Currently       The Past Medical History, Social History, Surgical History and Family History was reviewed as documented above.     Review of Systems   Review of Systems   Constitutional:  Negative for diaphoresis and fever.   Respiratory:  Positive for cough. Negative for shortness of breath.    Cardiovascular:  Negative for chest pain.   Gastrointestinal:  Negative for nausea and vomiting.   Genitourinary:  Negative for dysuria.   Musculoskeletal:  Negative for back pain.   Skin:  Negative for rash.   Neurological:  Negative for weakness.   Hematological:  Does not bruise/bleed easily.        Physical Exam     Initial Vitals   BP Pulse Resp Temp SpO2   12/11/24 1302 12/11/24 1258 12/11/24 1302 12/11/24 1734 12/11/24 1302   133/76 74 18 (!) 93.1 °F (33.9 °C) 100 %      MAP       --                 Physical Exam    Nursing Notes and Vital Signs Reviewed.  Constitutional: Patient is in no apparent distress. Well-developed and well-nourished.  Head: Atraumatic. Normocephalic.  Eyes: PERRL. EOM intact. Conjunctivae are not pale. No scleral icterus.  ENT: Mucous membranes are moist. Oropharynx is clear and symmetric.    Neck: Supple. Full ROM. No lymphadenopathy.  Cardiovascular: Regular rate. Regular rhythm. No murmurs, rubs, or gallops. Distal pulses are 2+ and symmetric.  Pulmonary/Chest: No respiratory distress. Clear to auscultation bilaterally. No wheezing or rales.  Abdominal: Soft and non-distended.  There is no tenderness.  No rebound, guarding, or rigidity. Good bowel sounds.  Musculoskeletal: Moves all extremities. No obvious deformities. No edema. No calf tenderness.  Skin: Warm and dry.  Neurological:  Alert, awake, and appropriate.  Normal speech.  No acute focal neurological deficits are appreciated.  Psychiatric: Normal affect. Good eye contact. Appropriate in content.     ED Course   ED Procedures:  Procedures    ED Vital Signs:  Vitals:    12/11/24 1258 12/11/24 1302 12/11/24 1330  "12/11/24 1415   BP:  133/76 (!) 118/57 132/67   Pulse: 74 72 65 68   Resp:  18 20 16   Temp:       TempSrc:  Oral     SpO2:  100% 99% 99%   Weight:  65.8 kg (144 lb 15.2 oz)     Height:  4' 8" (1.422 m)      12/11/24 1530 12/11/24 1545 12/11/24 1615 12/11/24 1630   BP: 133/65 136/67 (!) 145/65 (!) 152/70   Pulse: 71 74 70 70   Resp: 20 18 17 19   Temp:       TempSrc:       SpO2: 98% 99% 99% 99%   Weight:       Height:        12/11/24 1734 12/11/24 1830 12/11/24 1924 12/11/24 2003   BP:  128/65  139/68   Pulse:  77  89   Resp:  19  20   Temp: (!) 93.1 °F (33.9 °C)  (!) 93.6 °F (34.2 °C) 97.3 °F (36.3 °C)   TempSrc: Rectal  Rectal Oral   SpO2:  99%  98%   Weight:       Height:           All Lab Results:  Results for orders placed or performed during the hospital encounter of 12/11/24   EKG 12-lead    Collection Time: 12/11/24  1:03 PM   Result Value Ref Range    QRS Duration 126 ms    OHS QTC Calculation 474 ms   CBC auto differential    Collection Time: 12/11/24  1:25 PM   Result Value Ref Range    WBC 5.97 3.90 - 12.70 K/uL    RBC 4.06 4.00 - 5.40 M/uL    Hemoglobin 13.0 12.0 - 16.0 g/dL    Hematocrit 37.8 37.0 - 48.5 %    MCV 93 82 - 98 fL    MCH 32.0 (H) 27.0 - 31.0 pg    MCHC 34.4 32.0 - 36.0 g/dL    RDW 14.7 (H) 11.5 - 14.5 %    Platelets 90 (L) 150 - 450 K/uL    MPV 12.9 9.2 - 12.9 fL    Immature Granulocytes 0.2 0.0 - 0.5 %    Gran # (ANC) 4.5 1.8 - 7.7 K/uL    Immature Grans (Abs) 0.01 0.00 - 0.04 K/uL    Lymph # 1.2 1.0 - 4.8 K/uL    Mono # 0.3 0.3 - 1.0 K/uL    Eos # 0.0 0.0 - 0.5 K/uL    Baso # 0.01 0.00 - 0.20 K/uL    nRBC 0 0 /100 WBC    Gran % 75.0 (H) 38.0 - 73.0 %    Lymph % 19.4 18.0 - 48.0 %    Mono % 4.7 4.0 - 15.0 %    Eosinophil % 0.5 0.0 - 8.0 %    Basophil % 0.2 0.0 - 1.9 %    Platelet Estimate Decreased (A)     Large/Giant Platelets Present     Differential Method Automated    Comprehensive metabolic panel    Collection Time: 12/11/24  1:25 PM   Result Value Ref Range    Sodium 142 136 - 145 " mmol/L    Potassium 3.8 3.5 - 5.1 mmol/L    Chloride 104 95 - 110 mmol/L    CO2 26 23 - 29 mmol/L    Glucose 88 70 - 110 mg/dL    BUN 25 (H) 8 - 23 mg/dL    Creatinine 1.4 0.5 - 1.4 mg/dL    Calcium 10.6 (H) 8.7 - 10.5 mg/dL    Total Protein 8.6 (H) 6.0 - 8.4 g/dL    Albumin 3.4 (L) 3.5 - 5.2 g/dL    Total Bilirubin 0.4 0.1 - 1.0 mg/dL    Alkaline Phosphatase 121 40 - 150 U/L    AST 29 10 - 40 U/L    ALT 42 10 - 44 U/L    eGFR 39.2 (A) >60 mL/min/1.73 m^2    Anion Gap 12 8 - 16 mmol/L   Troponin I #1    Collection Time: 12/11/24  1:25 PM   Result Value Ref Range    Troponin I 0.015 0.000 - 0.026 ng/mL   BNP    Collection Time: 12/11/24  1:25 PM   Result Value Ref Range     (H) 0 - 99 pg/mL   Protime-INR    Collection Time: 12/11/24  1:25 PM   Result Value Ref Range    Prothrombin Time 10.3 9.0 - 12.5 sec    INR 0.9 0.8 - 1.2   APTT    Collection Time: 12/11/24  1:25 PM   Result Value Ref Range    aPTT 35.9 (H) 21.0 - 32.0 sec   TSH    Collection Time: 12/11/24  1:25 PM   Result Value Ref Range    TSH 3.662 0.400 - 4.000 uIU/mL   Urinalysis, Reflex to Urine Culture Urine, Clean Catch    Collection Time: 12/11/24  4:13 PM    Specimen: Urine   Result Value Ref Range    Specimen UA Urine, Clean Catch     Color, UA Yellow Yellow, Straw, Karen    Appearance, UA Clear Clear    pH, UA 7.0 5.0 - 8.0    Specific Gravity, UA 1.010 1.005 - 1.030    Protein, UA Negative Negative    Glucose, UA Negative Negative    Ketones, UA Negative Negative    Bilirubin (UA) Negative Negative    Occult Blood UA Negative Negative    Nitrite, UA Negative Negative    Urobilinogen, UA Negative <2.0 EU/dL    Leukocytes, UA Negative Negative   Troponin I #2    Collection Time: 12/11/24  4:22 PM   Result Value Ref Range    Troponin I 0.007 0.000 - 0.026 ng/mL   POCT glucose    Collection Time: 12/11/24  6:25 PM   Result Value Ref Range    POCT Glucose 86 70 - 110 mg/dL   Lactic acid, plasma    Collection Time: 12/11/24  6:51 PM   Result Value  Ref Range    Lactate (Lactic Acid) 0.8 0.5 - 2.2 mmol/L   Procalcitonin    Collection Time: 12/11/24  6:51 PM   Result Value Ref Range    Procalcitonin 0.17 <0.25 ng/mL         Reviewed Prior Labs:  9/24/2024  Normal myocardial perfusion scan. There is no evidence of myocardial ischemia or infarction.    The gated perfusion images showed an ejection fraction of 66% at rest. The gated perfusion images showed an ejection fraction of 75% post stress.    The ECG portion of the study is negative for ischemia.    The patient reported no chest pain during the stress test.    There were no arrhythmias during stress.      8/2024:  Left Ventricle: The left ventricle is normal in size. Normal wall thickness. There is concentric remodeling. There is normal systolic function with a visually estimated ejection fraction of 60 - 65%. Ejection fraction by visual approximation is 60%.    Right Ventricle: rv pacer noted Systolic function is normal.    Left Atrium: Left atrium is mildly dilated.    Tricuspid Valve: There is moderate regurgitation. There is mild pulmonary hypertension.    Pulmonary Artery: The estimated pulmonary artery systolic pressure is 43 mmHg.    IVC/SVC: Normal venous pressure at 3 mmHg.    Pericardium: There is a trivial effusion. No indication of cardiac tamponade.     The EKG was ordered, reviewed, and independently interpreted by the ED provider:      ECG Results              EKG 12-lead (Preliminary result)        Collection Time Result Time QRS Duration OHS QTC Calculation    12/11/24 13:03:17 12/11/24 15:38:42 126 474                     Wet Read by Sylwia Faust DO (12/11/24 15:38:59, [facility] - Emergency Dept, Emergency Medicine)    Rate of 78 beats per minute.  Normal sinus rhythm.  Right bundle-branch block.  T-wave inversion in V1 through V4.  No STEMI.                      In process by Interface, Lab In [facility] (12/11/24 13:19:48)                   Narrative:    Test Reason : R07.9,    Vent.  Rate :  78 BPM     Atrial Rate :  78 BPM     P-R Int : 144 ms          QRS Dur : 126 ms      QT Int : 416 ms       P-R-T Axes :  71  33  31 degrees    QTcB Int : 474 ms    Normal sinus rhythm  Right bundle branch block  T wave abnormality, consider lateral ischemia  Abnormal ECG  When compared with ECG of 26-Sep-2024 13:13,  No significant change was found    Referred By: AAAREFERRAL SELF           Confirmed By:                                     Imaging Results:  Imaging Results              X-Ray Chest AP Portable (Final result)  Result time 12/11/24 14:44:08      Final result by David Diallo MD (12/11/24 14:44:08)                   Impression:      1.  Negative for acute process involving the chest.    2.  Stable findings as noted above.      Electronically signed by: David Diallo MD  Date:    12/11/2024  Time:    14:44               Narrative:    EXAMINATION:  XR CHEST AP PORTABLE    CLINICAL HISTORY:  Cough, unspecified    COMPARISON:  August 22, 2024    FINDINGS:  EKG leads overlie the chest.  The lungs are clear. The cardiac silhouette size is enlarged.  The trachea is midline and the mediastinal width is normal. Negative for focal infiltrate, effusion or pneumothorax. Pulmonary vasculature is normal. Negative for osseous abnormalities. Implanted loop recorder.  Tortuous aorta with calcifications of the aortic knob.  There are degenerative changes of the spine and both shoulder girdles.                                            The Emergency Provider reviewed the vital signs and test results, which are outlined above.     ED Discussion   ED Medication(s):  Medications - No data to display    ED Course as of 12/11/24 2019   Wed Dec 11, 2024   1452 Platelet Count(!): 90 [LB]   1515 Secure chat with Dr. Velez:  Recommends interrogate pacemaker, transfer to [facility].  Patient and family agreeable with plan.  [LB]   1532 BNP(!): 116 [LB]      ED Course User Index  [LB] Sylwia Faust,              3:35 PM:  Secure chat with  Rocky Prasad NP. TAY Hewitt MD agrees with current care and management of pt and accepts admission.   Admitting/Observing Service: Hospital Medicine Service   Admitting Physician: Rocky Prasad NP  Floor:Med/Tele    3:36 PM   All historical, clinical, radiographic, and laboratory findings were reviewed with the patient/family in detail.  I discussed the indications and treatment need: (Internal Medicine and Cardiology) .    Patient/Family requests transfer to: [facility]    Patient/Family understands that [facility] does provide (Internal Medicine and Cardiology) services.     Patient/family verbalized understanding.   All remaining questions and concerns were addressed at that time and the patient/family agrees to proceed accordingly.  Similarly all pertinent details of the encounter were discussed with Rocky Prasad NP at [facility] . Rocky Prasad NP agrees to accept the patient in transfer based on the needs/patient preferences outlined above.  [facility] , secondary to a need for ongoing Cardiac Monitoring en route.  Risks: of transfer:    inclement weather, loss of vitals signs, permanent neurologic damage, MVC, loss of current lifestyle,  and/or death.  Benefits of transfer: Internal Medicine and Cardiology.  Patient and/or family agree and verbalize understanding.     Discussed case with HM, temp normalized, pt in nad, denies c/o. Neg trops, VSS will follow up with Cardiology as outpt to assess antianginal regimen    Smoker? No     Hypertension: Patient has a known history of hypertension.     Medical Decision Making                 Medical Decision Making  Differential diagnosis:  Lab error, atypical ACS, chronically elevated troponin, pneumonia    ED course: EKG shows no STEMI.  WBC 5.97.  H/H normal.  BUN 25.  Creatinine 1.4.  Calcium 10.6.   "Troponin 0.015.  .  Secure chat with Cardiology.  Recommended patient be placed in observation.  Hospital Medicine placed in observation.    Amount and/or Complexity of Data Reviewed  Labs: ordered. Decision-making details documented in ED Course.  Radiology: ordered. Decision-making details documented in ED Course.  ECG/medicine tests: ordered and independent interpretation performed. Decision-making details documented in ED Course.    Risk  Decision regarding hospitalization.        Coding    Referrals:  No orders of the defined types were placed in this encounter.      Prescription Management: I performed a review of the patient's current Rx medication list as input by nursing staff.    Patient's Medications   New Prescriptions    No medications on file   Previous Medications    ACETAMINOPHEN (TYLENOL) 325 MG TABLET    Take 1 tablet (325 mg total) by mouth every 6 (six) hours as needed for Pain.    AMLODIPINE (NORVASC) 10 MG TABLET    Take 1 tablet (10 mg total) by mouth once daily.    ASPIRIN (ECOTRIN) 81 MG EC TABLET    Take 1 tablet (81 mg total) by mouth once daily.    HYDROCHLOROTHIAZIDE (HYDRODIURIL) 25 MG TABLET    Take 1 tablet (25 mg total) by mouth once daily. Can take twice a day for 2-3 days if retaining more fluid, do not take if BP is below 110/70    RISPERIDONE (RISPERDAL) 0.25 MG TAB    Take by mouth.    SIMVASTATIN (ZOCOR) 20 MG TABLET    Take 1 tablet (20 mg total) by mouth every evening.   Modified Medications    No medications on file   Discontinued Medications    No medications on file        Discussed case verbally with: N/A      Portions of this note may have been created with voice recognition software. Occasional "wrong-word" or "sound-a-like" substitutions may have occurred due to the inherent limitations of voice recognition software. Please, read the note carefully and recognize, using context, where substitutions have occurred.          Clinical Impression       ICD-10-CM " ICD-9-CM   1. Thrombocytopenia  D69.6 287.5   2. Chest pain  R07.9 786.50   3. Cough  R05.9 786.2        Disposition        Disposition: Discharge Home  Patient condition: Stable    ED Follow-up      Follow-up Information       Markus Peterson MD. Schedule an appointment as soon as possible for a visit in 2 days.    Specialties: Internal Medicine, Family Medicine  Contact information:  [address] [address] [address]  [phone]               [facility] - Emergency Dept.    Specialty: Emergency Medicine  Why: If symptoms worsen  Contact information:  [address] [facility]  Emergency Department  [facility] [address] [phone]             Schedule an appointment as soon as possible for a visit  with [facility] CARDIOLOGY.    Specialty: Cardiology  Contact information:  [address] [facility] [address] [phone]                                        Mode Powers MD  12/11/24 2019

## 2024-12-12 ENCOUNTER — PATIENT OUTREACH (OUTPATIENT)
Dept: ADMINISTRATIVE | Facility: CLINIC | Age: 74
End: 2024-12-12
Payer: MEDICARE

## 2024-12-12 NOTE — PROGRESS NOTES
TCC Post Discharge Call made non-applicable due to pt being just an ED observation pt, not actually admitted into the hospital. No messages routed at this time.

## 2024-12-13 LAB
OHS QRS DURATION: 126 MS
OHS QTC CALCULATION: 474 MS

## 2024-12-17 LAB
BACTERIA BLD CULT: NORMAL
BACTERIA BLD CULT: NORMAL

## 2025-01-29 RX ORDER — AMLODIPINE BESYLATE 10 MG/1
10 TABLET ORAL
Qty: 30 TABLET | Refills: 0 | Status: SHIPPED | OUTPATIENT
Start: 2025-01-29

## 2025-02-25 ENCOUNTER — TELEPHONE (OUTPATIENT)
Dept: INTERNAL MEDICINE | Facility: CLINIC | Age: 75
End: 2025-02-25
Payer: MEDICARE

## 2025-02-25 NOTE — TELEPHONE ENCOUNTER
----- Message from Emilia sent at 2/25/2025  8:36 AM CST -----  Contact: [phone] .[email or web address] Patient is calling for Medical Advice regarding:pt had a fall this morning How long has patient had these symptoms:fell on her buttocks Pharmacy name and phone#:[facility]:call back Comments:Please advise Please advise patient replies from provider may take up to 48 hours.Symptom: FallOutcome: Schedule an appointment to be seen within 24 hours.Reason: Caller denied all higher acuity questionsThe caller accepted this outcome.

## 2025-03-05 RX ORDER — AMLODIPINE BESYLATE 10 MG/1
10 TABLET ORAL
Qty: 30 TABLET | Refills: 1 | Status: SHIPPED | OUTPATIENT
Start: 2025-03-05